# Patient Record
Sex: FEMALE | Race: WHITE | NOT HISPANIC OR LATINO
[De-identification: names, ages, dates, MRNs, and addresses within clinical notes are randomized per-mention and may not be internally consistent; named-entity substitution may affect disease eponyms.]

---

## 2018-05-14 ENCOUNTER — LABORATORY RESULT (OUTPATIENT)
Age: 33
End: 2018-05-14

## 2018-05-14 ENCOUNTER — NON-APPOINTMENT (OUTPATIENT)
Age: 33
End: 2018-05-14

## 2018-05-14 ENCOUNTER — APPOINTMENT (OUTPATIENT)
Dept: INTERNAL MEDICINE | Facility: CLINIC | Age: 33
End: 2018-05-14
Payer: COMMERCIAL

## 2018-05-14 VITALS
SYSTOLIC BLOOD PRESSURE: 120 MMHG | DIASTOLIC BLOOD PRESSURE: 70 MMHG | TEMPERATURE: 98.3 F | RESPIRATION RATE: 12 BRPM | HEART RATE: 76 BPM | WEIGHT: 163 LBS | OXYGEN SATURATION: 99 % | BODY MASS INDEX: 26.2 KG/M2 | HEIGHT: 66 IN

## 2018-05-14 DIAGNOSIS — Z78.9 OTHER SPECIFIED HEALTH STATUS: ICD-10-CM

## 2018-05-14 DIAGNOSIS — Z82.49 FAMILY HISTORY OF ISCHEMIC HEART DISEASE AND OTHER DISEASES OF THE CIRCULATORY SYSTEM: ICD-10-CM

## 2018-05-14 DIAGNOSIS — Z83.3 FAMILY HISTORY OF DIABETES MELLITUS: ICD-10-CM

## 2018-05-14 DIAGNOSIS — Z87.891 PERSONAL HISTORY OF NICOTINE DEPENDENCE: ICD-10-CM

## 2018-05-14 DIAGNOSIS — Z86.19 PERSONAL HISTORY OF OTHER INFECTIOUS AND PARASITIC DISEASES: ICD-10-CM

## 2018-05-14 DIAGNOSIS — Z83.49 FAMILY HISTORY OF OTHER ENDOCRINE, NUTRITIONAL AND METABOLIC DISEASES: ICD-10-CM

## 2018-05-14 PROCEDURE — 36415 COLL VENOUS BLD VENIPUNCTURE: CPT

## 2018-05-14 PROCEDURE — 93000 ELECTROCARDIOGRAM COMPLETE: CPT

## 2018-05-14 PROCEDURE — 99385 PREV VISIT NEW AGE 18-39: CPT | Mod: 25

## 2018-05-14 RX ORDER — IMIQUIMOD 50 MG/G
5 CREAM TOPICAL
Qty: 12 | Refills: 0 | Status: DISCONTINUED | COMMUNITY
Start: 2018-01-02

## 2018-05-14 RX ORDER — ETONOGESTREL AND ETHINYL ESTRADIOL .12; .015 MG/D; MG/D
0.12-0.015 INSERT, EXTENDED RELEASE VAGINAL
Qty: 1 | Refills: 0 | Status: DISCONTINUED | COMMUNITY
Start: 2017-08-30

## 2018-05-14 RX ORDER — VALACYCLOVIR 1 G/1
1 TABLET, FILM COATED ORAL
Qty: 20 | Refills: 0 | Status: DISCONTINUED | COMMUNITY
Start: 2017-09-19

## 2018-05-14 RX ORDER — FLUOCINONIDE 0.05 MG/G
0.05 OINTMENT TOPICAL
Qty: 30 | Refills: 0 | Status: DISCONTINUED | COMMUNITY
Start: 2018-03-28

## 2018-05-14 RX ORDER — VALACYCLOVIR 500 MG/1
500 TABLET, FILM COATED ORAL
Qty: 30 | Refills: 0 | Status: DISCONTINUED | COMMUNITY
Start: 2017-12-03

## 2018-05-14 RX ORDER — LIDOCAINE HYDROCHLORIDE 20 MG/ML
2 SOLUTION OROPHARYNGEAL
Qty: 200 | Refills: 0 | Status: DISCONTINUED | COMMUNITY
Start: 2018-05-02

## 2018-05-14 RX ORDER — FLUCONAZOLE 150 MG/1
150 TABLET ORAL
Qty: 1 | Refills: 0 | Status: DISCONTINUED | COMMUNITY
Start: 2018-03-12

## 2018-05-14 RX ORDER — PREDNISONE 50 MG/1
50 TABLET ORAL
Qty: 10 | Refills: 0 | Status: DISCONTINUED | COMMUNITY
Start: 2018-03-28

## 2018-05-14 RX ORDER — CLOTRIMAZOLE 10 MG/1
10 LOZENGE ORAL
Qty: 70 | Refills: 0 | Status: DISCONTINUED | COMMUNITY
Start: 2018-05-08

## 2018-05-14 RX ORDER — NYSTATIN 100000 [USP'U]/ML
100000 SUSPENSION ORAL
Qty: 200 | Refills: 0 | Status: DISCONTINUED | COMMUNITY
Start: 2018-05-03

## 2018-05-16 LAB
25(OH)D3 SERPL-MCNC: 34.7 NG/ML
ALBUMIN SERPL ELPH-MCNC: 4 G/DL
ALP BLD-CCNC: 42 U/L
ALT SERPL-CCNC: 25 U/L
ANION GAP SERPL CALC-SCNC: 13 MMOL/L
APPEARANCE: CLEAR
AST SERPL-CCNC: 27 U/L
BACTERIA: NEGATIVE
BASOPHILS # BLD AUTO: 0.03 K/UL
BASOPHILS NFR BLD AUTO: 0.6 %
BILIRUB SERPL-MCNC: 0.2 MG/DL
BILIRUBIN URINE: NEGATIVE
BLOOD URINE: NEGATIVE
BUN SERPL-MCNC: 12 MG/DL
CALCIUM SERPL-MCNC: 9.3 MG/DL
CHLORIDE SERPL-SCNC: 100 MMOL/L
CHOLEST SERPL-MCNC: 209 MG/DL
CHOLEST/HDLC SERPL: 3.4 RATIO
CO2 SERPL-SCNC: 21 MMOL/L
COLOR: YELLOW
CREAT SERPL-MCNC: 0.77 MG/DL
EOSINOPHIL # BLD AUTO: 0.08 K/UL
EOSINOPHIL NFR BLD AUTO: 1.5 %
GLUCOSE QUALITATIVE U: NEGATIVE MG/DL
GLUCOSE SERPL-MCNC: 77 MG/DL
HBA1C MFR BLD HPLC: 5.1 %
HCT VFR BLD CALC: 36.2 %
HDLC SERPL-MCNC: 62 MG/DL
HGB BLD-MCNC: 11.6 G/DL
HYALINE CASTS: 8 /LPF
IMM GRANULOCYTES NFR BLD AUTO: 0.2 %
KETONES URINE: NEGATIVE
LDLC SERPL CALC-MCNC: 119 MG/DL
LEUKOCYTE ESTERASE URINE: ABNORMAL
LYMPHOCYTES # BLD AUTO: 2.43 K/UL
LYMPHOCYTES NFR BLD AUTO: 46.1 %
MAN DIFF?: NORMAL
MCHC RBC-ENTMCNC: 31.5 PG
MCHC RBC-ENTMCNC: 32 GM/DL
MCV RBC AUTO: 98.4 FL
MICROSCOPIC-UA: NORMAL
MONOCYTES # BLD AUTO: 0.4 K/UL
MONOCYTES NFR BLD AUTO: 7.6 %
NEUTROPHILS # BLD AUTO: 2.32 K/UL
NEUTROPHILS NFR BLD AUTO: 44 %
NITRITE URINE: NEGATIVE
PH URINE: 6
PLATELET # BLD AUTO: 242 K/UL
POTASSIUM SERPL-SCNC: 4 MMOL/L
PROT SERPL-MCNC: 6.9 G/DL
PROTEIN URINE: NEGATIVE MG/DL
RBC # BLD: 3.68 M/UL
RBC # FLD: 13 %
RED BLOOD CELLS URINE: 2 /HPF
SODIUM SERPL-SCNC: 134 MMOL/L
SPECIFIC GRAVITY URINE: 1.02
SQUAMOUS EPITHELIAL CELLS: 9 /HPF
T4 FREE SERPL-MCNC: 1.3 NG/DL
TRIGL SERPL-MCNC: 142 MG/DL
TSH SERPL-ACNC: 3.11 UIU/ML
UROBILINOGEN URINE: NEGATIVE MG/DL
WBC # FLD AUTO: 5.27 K/UL
WHITE BLOOD CELLS URINE: 8 /HPF

## 2018-05-18 ENCOUNTER — APPOINTMENT (OUTPATIENT)
Dept: INTERNAL MEDICINE | Facility: CLINIC | Age: 33
End: 2018-05-18
Payer: COMMERCIAL

## 2018-05-18 ENCOUNTER — TRANSCRIPTION ENCOUNTER (OUTPATIENT)
Age: 33
End: 2018-05-18

## 2018-05-18 VITALS
DIASTOLIC BLOOD PRESSURE: 80 MMHG | TEMPERATURE: 99 F | SYSTOLIC BLOOD PRESSURE: 124 MMHG | OXYGEN SATURATION: 99 % | HEART RATE: 82 BPM

## 2018-05-18 PROCEDURE — 99213 OFFICE O/P EST LOW 20 MIN: CPT

## 2018-05-18 NOTE — HISTORY OF PRESENT ILLNESS
[FreeTextEntry8] : Patient initially seen for physical on 5/14. She has been sick since then. She developed fever, chills, fatigue, headache, and intense body aches. She had fever of 101F yesterday. She has remained in bed most of time. She has scattered rash throughout body but is not itchy or painful. Rapid influenza screening on 5/14 was negative. Travelled to [place] 3 months ago for her wedding. 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

## 2018-05-18 NOTE — REVIEW OF SYSTEMS
[Fever] : fever [Chills] : chills [Fatigue] : fatigue [Skin Rash] : skin rash [Headache] : headache [Earache] : no earache [Nasal Discharge] : no nasal discharge [Sore Throat] : no sore throat [Chest Pain] : no chest pain [Lower Ext Edema] : no lower extremity edema [Shortness Of Breath] : no shortness of breath [Cough] : no cough [Abdominal Pain] : no abdominal pain [Constipation] : no constipation [Itching] : no itching [Dizziness] : no dizziness

## 2018-05-18 NOTE — ASSESSMENT
[FreeTextEntry1] : I suspect patient has a viral URI. Rapid influenza screening on 5/14 was negative. She has fever, chills, body aches, and headaches which indicates influenza-like syndrome. Will check RVP today. Will add on EBV, CMV IgM, and HIV screening. She will complete the Tamiflu which was started last night. Continue Ibuprofen PRN for fever or body aches. Letter written to excuse her absence from work yesterday and today. If workup is negative and patient remains ill, will obtain ID consultation for further evaluation.

## 2018-05-18 NOTE — PHYSICAL EXAM
[No Acute Distress] : no acute distress [Well Nourished] : well nourished [Well Developed] : well developed [PERRL] : pupils equal round and reactive to light [EOMI] : extraocular movements intact [Normal Oropharynx] : the oropharynx was normal [Normal TMs] : both tympanic membranes were normal [Supple] : supple [No Lymphadenopathy] : no lymphadenopathy [No Respiratory Distress] : no respiratory distress  [Clear to Auscultation] : lungs were clear to auscultation bilaterally [Normal Rate] : normal rate  [Normal S1, S2] : normal S1 and S2 [No Murmur] : no murmur heard [No Edema] : there was no peripheral edema [Soft] : abdomen soft [Non Tender] : non-tender [Non-distended] : non-distended [No Joint Swelling] : no joint swelling [Normal Gait] : normal gait [de-identified] : scattered macular-appearing rash over legs, arms, and upper chest

## 2018-05-19 ENCOUNTER — TRANSCRIPTION ENCOUNTER (OUTPATIENT)
Age: 33
End: 2018-05-19

## 2018-05-19 LAB — RAPID RVP RESULT: NOT DETECTED

## 2018-05-22 ENCOUNTER — TRANSCRIPTION ENCOUNTER (OUTPATIENT)
Age: 33
End: 2018-05-22

## 2018-05-24 ENCOUNTER — TRANSCRIPTION ENCOUNTER (OUTPATIENT)
Age: 33
End: 2018-05-24

## 2018-05-25 ENCOUNTER — APPOINTMENT (OUTPATIENT)
Dept: INTERNAL MEDICINE | Facility: CLINIC | Age: 33
End: 2018-05-25
Payer: COMMERCIAL

## 2018-05-25 ENCOUNTER — LABORATORY RESULT (OUTPATIENT)
Age: 33
End: 2018-05-25

## 2018-05-25 ENCOUNTER — APPOINTMENT (OUTPATIENT)
Dept: RADIOLOGY | Facility: CLINIC | Age: 33
End: 2018-05-25
Payer: COMMERCIAL

## 2018-05-25 ENCOUNTER — OUTPATIENT (OUTPATIENT)
Dept: OUTPATIENT SERVICES | Facility: HOSPITAL | Age: 33
LOS: 1 days | End: 2018-05-25
Payer: COMMERCIAL

## 2018-05-25 VITALS
TEMPERATURE: 102.1 F | DIASTOLIC BLOOD PRESSURE: 68 MMHG | HEIGHT: 66 IN | SYSTOLIC BLOOD PRESSURE: 120 MMHG | RESPIRATION RATE: 12 BRPM | WEIGHT: 163 LBS | HEART RATE: 108 BPM | OXYGEN SATURATION: 99 % | BODY MASS INDEX: 26.2 KG/M2

## 2018-05-25 DIAGNOSIS — R50.9 FEVER, UNSPECIFIED: ICD-10-CM

## 2018-05-25 PROCEDURE — 99213 OFFICE O/P EST LOW 20 MIN: CPT | Mod: 25

## 2018-05-25 PROCEDURE — 71046 X-RAY EXAM CHEST 2 VIEWS: CPT

## 2018-05-25 PROCEDURE — 71046 X-RAY EXAM CHEST 2 VIEWS: CPT | Mod: 26

## 2018-05-25 PROCEDURE — 36415 COLL VENOUS BLD VENIPUNCTURE: CPT

## 2018-05-25 RX ORDER — ETONOGESTREL AND ETHINYL ESTRADIOL .12; .015 MG/D; MG/D
0.12-0.015 INSERT, EXTENDED RELEASE VAGINAL
Refills: 0 | Status: DISCONTINUED | COMMUNITY
End: 2018-05-25

## 2018-05-25 RX ORDER — CEFUROXIME AXETIL 500 MG/1
500 TABLET ORAL
Qty: 20 | Refills: 0 | Status: DISCONTINUED | COMMUNITY
Start: 2018-05-25 | End: 2018-05-25

## 2018-05-25 RX ORDER — OSELTAMIVIR PHOSPHATE 75 MG/1
75 CAPSULE ORAL TWICE DAILY
Qty: 10 | Refills: 0 | Status: DISCONTINUED | COMMUNITY
Start: 2018-05-17 | End: 2018-05-25

## 2018-05-25 NOTE — PHYSICAL EXAM
[No Acute Distress] : no acute distress [Well Nourished] : well nourished [Well Developed] : well developed [PERRL] : pupils equal round and reactive to light [EOMI] : extraocular movements intact [Normal Oropharynx] : the oropharynx was normal [Normal TMs] : both tympanic membranes were normal [Supple] : supple [No Lymphadenopathy] : no lymphadenopathy [No Respiratory Distress] : no respiratory distress  [Clear to Auscultation] : lungs were clear to auscultation bilaterally [Normal Rate] : normal rate  [Normal S1, S2] : normal S1 and S2 [No Murmur] : no murmur heard [No Edema] : there was no peripheral edema [Soft] : abdomen soft [Non Tender] : non-tender [Non-distended] : non-distended [No Joint Swelling] : no joint swelling [Normal Gait] : normal gait [Ill-Appearing] : ill-appearing [Grossly Normal Strength/Tone] : grossly normal strength/tone [de-identified] : skin very warm, reddened face, patient visibly upset  [de-identified] : scattered macular-appearing rash over legs, arms, and abdomen; skin very hot to touch; face is erythematous

## 2018-05-25 NOTE — ASSESSMENT
[FreeTextEntry1] : Patient continues to have febrile illness for past 2 weeks. Etiology remains unclear. ?rheumatic fever vs lyme disease. Will do further testing. Check CBC, CMP, ESR, CRP, lyme, blood culture, and CXR. Start Doxycycline. Patient will see ID specialist (Dr. Isidro Bustillo) next week. If sx worsen over weekend, patient will go to the ER for evaluation. Will speak to patient in a few days for a clinical update.

## 2018-05-25 NOTE — HISTORY OF PRESENT ILLNESS
[FreeTextEntry1] : Follow-up [de-identified] : Patient here for 1 week follow-up. She continues to feel sick. She is still having intermittent fevers. 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 She has chills. She took Advil for the fever. She still feels fatigued. She continues to have myalgias and intermittent joint pains on legs. She has no cough, runny nose, or sneezing. No sinus pressure. She still has scattered rash on feet, stomach, arms, and back. The rash is not itchy.

## 2018-05-26 ENCOUNTER — MESSAGE (OUTPATIENT)
Age: 33
End: 2018-05-26

## 2018-05-27 ENCOUNTER — MESSAGE (OUTPATIENT)
Age: 33
End: 2018-05-27

## 2018-05-27 ENCOUNTER — INPATIENT (INPATIENT)
Facility: HOSPITAL | Age: 33
LOS: 1 days | Discharge: HOME | DRG: 872 | End: 2018-05-29
Attending: HOSPITALIST | Admitting: HOSPITALIST
Payer: COMMERCIAL

## 2018-05-27 VITALS
SYSTOLIC BLOOD PRESSURE: 130 MMHG | HEART RATE: 89 BPM | DIASTOLIC BLOOD PRESSURE: 76 MMHG | RESPIRATION RATE: 16 BRPM | TEMPERATURE: 99 F | OXYGEN SATURATION: 100 %

## 2018-05-27 DIAGNOSIS — Z29.9 ENCOUNTER FOR PROPHYLACTIC MEASURES, UNSPECIFIED: ICD-10-CM

## 2018-05-27 DIAGNOSIS — R91.1 SOLITARY PULMONARY NODULE: ICD-10-CM

## 2018-05-27 DIAGNOSIS — Z87.891 PERSONAL HISTORY OF NICOTINE DEPENDENCE: ICD-10-CM

## 2018-05-27 DIAGNOSIS — R78.81 BACTEREMIA: ICD-10-CM

## 2018-05-27 LAB
ALBUMIN SERPL ELPH-MCNC: 3.9 G/DL
ALP BLD-CCNC: 51 U/L
ALT SERPL-CCNC: 15 U/L
ANION GAP SERPL CALC-SCNC: 13 MMOL/L
APPEARANCE UR: ABNORMAL
APTT BLD: 27.6 SEC — SIGNIFICANT CHANGE UP (ref 27.5–37.4)
AST SERPL-CCNC: 24 U/L
B BURGDOR IGG+IGM SER QL IB: NORMAL
BACTERIA # UR AUTO: ABNORMAL /HPF
BASE EXCESS BLDV CALC-SCNC: 0.5 MMOL/L — SIGNIFICANT CHANGE UP (ref -2–2)
BASOPHILS # BLD AUTO: 0.02 K/UL
BASOPHILS NFR BLD AUTO: 0.2 %
BILIRUB SERPL-MCNC: 0.3 MG/DL
BILIRUB UR-MCNC: NEGATIVE — SIGNIFICANT CHANGE UP
BUN SERPL-MCNC: 15 MG/DL
CA-I SERPL-SCNC: 1.15 MMOL/L — SIGNIFICANT CHANGE UP (ref 1.12–1.3)
CALCIUM SERPL-MCNC: 8.7 MG/DL
CHLORIDE BLDV-SCNC: 109 MMOL/L — HIGH (ref 96–108)
CHLORIDE SERPL-SCNC: 100 MMOL/L
CO2 BLDV-SCNC: 26 MMOL/L — SIGNIFICANT CHANGE UP (ref 22–30)
CO2 SERPL-SCNC: 22 MMOL/L
COLOR SPEC: SIGNIFICANT CHANGE UP
CREAT SERPL-MCNC: 0.85 MG/DL
CRP SERPL-MCNC: 6.5 MG/DL
DIFF PNL FLD: NEGATIVE — SIGNIFICANT CHANGE UP
EOSINOPHIL # BLD AUTO: 0.02 K/UL
EOSINOPHIL NFR BLD AUTO: 0.2 %
EPI CELLS # UR: ABNORMAL /HPF
ERYTHROCYTE [SEDIMENTATION RATE] IN BLOOD BY WESTERGREN METHOD: 20 MM/HR
GAS PNL BLDV: 136 MMOL/L — SIGNIFICANT CHANGE UP (ref 136–145)
GAS PNL BLDV: SIGNIFICANT CHANGE UP
GLUCOSE BLDV-MCNC: 99 MG/DL — SIGNIFICANT CHANGE UP (ref 70–99)
GLUCOSE SERPL-MCNC: 63 MG/DL
GLUCOSE UR QL: NEGATIVE — SIGNIFICANT CHANGE UP
HCO3 BLDV-SCNC: 25 MMOL/L — SIGNIFICANT CHANGE UP (ref 21–29)
HCT VFR BLD CALC: 35.3 %
HCT VFR BLDA CALC: 36 % — LOW (ref 39–50)
HGB BLD CALC-MCNC: 11.5 G/DL — SIGNIFICANT CHANGE UP (ref 11.5–15.5)
HGB BLD-MCNC: 11.1 G/DL
IMM GRANULOCYTES NFR BLD AUTO: 0.3 %
INR BLD: 1.09 RATIO — SIGNIFICANT CHANGE UP (ref 0.88–1.16)
KETONES UR-MCNC: NEGATIVE — SIGNIFICANT CHANGE UP
LACTATE BLDV-MCNC: 1 MMOL/L — SIGNIFICANT CHANGE UP (ref 0.7–2)
LEUKOCYTE ESTERASE UR-ACNC: ABNORMAL
LYMPHOCYTES # BLD AUTO: 1.31 K/UL
LYMPHOCYTES NFR BLD AUTO: 12.8 %
MAN DIFF?: NORMAL
MCHC RBC-ENTMCNC: 31 PG
MCHC RBC-ENTMCNC: 31.4 GM/DL
MCV RBC AUTO: 98.6 FL
MONOCYTES # BLD AUTO: 0.47 K/UL
MONOCYTES NFR BLD AUTO: 4.6 %
NEUTROPHILS # BLD AUTO: 8.42 K/UL
NEUTROPHILS NFR BLD AUTO: 81.9 %
NITRITE UR-MCNC: NEGATIVE — SIGNIFICANT CHANGE UP
OTHER CELLS CSF MANUAL: 10 ML/DL — LOW (ref 18–22)
PCO2 BLDV: 43 MMHG — SIGNIFICANT CHANGE UP (ref 35–50)
PH BLDV: 7.38 — SIGNIFICANT CHANGE UP (ref 7.35–7.45)
PH UR: 6 — SIGNIFICANT CHANGE UP (ref 5–8)
PLATELET # BLD AUTO: 250 K/UL
PO2 BLDV: 36 MMHG — SIGNIFICANT CHANGE UP (ref 25–45)
POTASSIUM BLDV-SCNC: 3.8 MMOL/L — SIGNIFICANT CHANGE UP (ref 3.5–5)
POTASSIUM SERPL-SCNC: 3.7 MMOL/L
PROT SERPL-MCNC: 7 G/DL
PROT UR-MCNC: NEGATIVE — SIGNIFICANT CHANGE UP
PROTHROM AB SERPL-ACNC: 11.8 SEC — SIGNIFICANT CHANGE UP (ref 9.8–12.7)
RAPID RVP RESULT: DETECTED
RBC # BLD: 3.58 M/UL
RBC # FLD: 12.6 %
RBC CASTS # UR COMP ASSIST: SIGNIFICANT CHANGE UP /HPF (ref 0–2)
RV+EV RNA SPEC QL NAA+PROBE: DETECTED
SAO2 % BLDV: 63 % — LOW (ref 67–88)
SODIUM SERPL-SCNC: 135 MMOL/L
SP GR SPEC: 1.01 — LOW (ref 1.01–1.02)
UROBILINOGEN FLD QL: NEGATIVE — SIGNIFICANT CHANGE UP
WBC # FLD AUTO: 10.27 K/UL
WBC UR QL: SIGNIFICANT CHANGE UP /HPF (ref 0–5)

## 2018-05-27 PROCEDURE — 99254 IP/OBS CNSLTJ NEW/EST MOD 60: CPT | Mod: GC

## 2018-05-27 PROCEDURE — 99285 EMERGENCY DEPT VISIT HI MDM: CPT | Mod: 25

## 2018-05-27 PROCEDURE — 71046 X-RAY EXAM CHEST 2 VIEWS: CPT | Mod: 26

## 2018-05-27 PROCEDURE — 99223 1ST HOSP IP/OBS HIGH 75: CPT | Mod: GC

## 2018-05-27 RX ORDER — CEFTRIAXONE 500 MG/1
2 INJECTION, POWDER, FOR SOLUTION INTRAMUSCULAR; INTRAVENOUS EVERY 12 HOURS
Qty: 0 | Refills: 0 | Status: DISCONTINUED | OUTPATIENT
Start: 2018-05-27 | End: 2018-05-27

## 2018-05-27 RX ORDER — CEFTRIAXONE 500 MG/1
2 INJECTION, POWDER, FOR SOLUTION INTRAMUSCULAR; INTRAVENOUS EVERY 24 HOURS
Qty: 0 | Refills: 0 | Status: COMPLETED | OUTPATIENT
Start: 2018-05-27 | End: 2018-05-27

## 2018-05-27 RX ORDER — SODIUM CHLORIDE 9 MG/ML
1000 INJECTION INTRAMUSCULAR; INTRAVENOUS; SUBCUTANEOUS ONCE
Qty: 0 | Refills: 0 | Status: COMPLETED | OUTPATIENT
Start: 2018-05-27 | End: 2018-05-27

## 2018-05-27 RX ORDER — SODIUM CHLORIDE 9 MG/ML
1000 INJECTION, SOLUTION INTRAVENOUS
Qty: 0 | Refills: 0 | Status: DISCONTINUED | OUTPATIENT
Start: 2018-05-27 | End: 2018-05-27

## 2018-05-27 RX ORDER — CEFTRIAXONE 500 MG/1
2 INJECTION, POWDER, FOR SOLUTION INTRAMUSCULAR; INTRAVENOUS EVERY 12 HOURS
Qty: 0 | Refills: 0 | Status: DISCONTINUED | OUTPATIENT
Start: 2018-05-27 | End: 2018-05-29

## 2018-05-27 RX ORDER — SODIUM CHLORIDE 9 MG/ML
1000 INJECTION, SOLUTION INTRAVENOUS
Qty: 0 | Refills: 0 | Status: DISCONTINUED | OUTPATIENT
Start: 2018-05-27 | End: 2018-05-28

## 2018-05-27 RX ORDER — ACETAMINOPHEN 500 MG
975 TABLET ORAL ONCE
Qty: 0 | Refills: 0 | Status: COMPLETED | OUTPATIENT
Start: 2018-05-27 | End: 2018-05-27

## 2018-05-27 RX ORDER — CEFTRIAXONE 500 MG/1
2 INJECTION, POWDER, FOR SOLUTION INTRAMUSCULAR; INTRAVENOUS EVERY 24 HOURS
Qty: 0 | Refills: 0 | Status: DISCONTINUED | OUTPATIENT
Start: 2018-05-27 | End: 2018-05-27

## 2018-05-27 RX ORDER — CEFTRIAXONE 500 MG/1
2 INJECTION, POWDER, FOR SOLUTION INTRAMUSCULAR; INTRAVENOUS ONCE
Qty: 0 | Refills: 0 | Status: DISCONTINUED | OUTPATIENT
Start: 2018-05-27 | End: 2018-05-27

## 2018-05-27 RX ADMIN — CEFTRIAXONE 100 GRAM(S): 500 INJECTION, POWDER, FOR SOLUTION INTRAMUSCULAR; INTRAVENOUS at 07:57

## 2018-05-27 RX ADMIN — SODIUM CHLORIDE 1000 MILLILITER(S): 9 INJECTION INTRAMUSCULAR; INTRAVENOUS; SUBCUTANEOUS at 07:57

## 2018-05-27 RX ADMIN — SODIUM CHLORIDE 50 MILLILITER(S): 9 INJECTION, SOLUTION INTRAVENOUS at 15:21

## 2018-05-27 RX ADMIN — CEFTRIAXONE 100 GRAM(S): 500 INJECTION, POWDER, FOR SOLUTION INTRAMUSCULAR; INTRAVENOUS at 19:53

## 2018-05-27 RX ADMIN — SODIUM CHLORIDE 100 MILLILITER(S): 9 INJECTION, SOLUTION INTRAVENOUS at 15:40

## 2018-05-27 RX ADMIN — Medication 975 MILLIGRAM(S): at 11:48

## 2018-05-27 NOTE — ED PROVIDER NOTE - CARE PLAN
Principal Discharge DX:	Bacteremia Principal Discharge DX:	Bacteremia  Secondary Diagnosis:	Neisseria meningitidis

## 2018-05-27 NOTE — H&P ADULT - ASSESSMENT
33F no PMHx who p/w fevers and rash 33F no PMHx who p/w fevers and rash found to have Neisseria Meningitidis Bacteremia on outpatient cultures.

## 2018-05-27 NOTE — ED ADULT TRIAGE NOTE - CHIEF COMPLAINT QUOTE
sent by pmd for positive blood cultures Neisseria meningitidis, states fever for 3wks, afebrile on arrival

## 2018-05-27 NOTE — H&P ADULT - NSHPSOCIALHISTORY_GEN_ALL_CORE
-Lives with , sexually active on birth control, works as   -intermittently still smokes cigarettes - used to smoke 7 cigarettes days (quit 7 years ago), 3-4 alcoholic beverages weekly, no illicit drugs

## 2018-05-27 NOTE — H&P ADULT - HISTORY OF PRESENT ILLNESS
33F with no significant PMHx sent in by primary care physician for outpatient blood cultures positive for Neisseria Meningitidis. Pt. works as a  and had been in usual state of health when she noticed fevers to 102 starting May 12. Then starting May 14 she developed a rash, initially on her UEs that later spread to her trunk, back, and LEs. She had gone to her primary care physician, who initially suspected viral etiology and prescribed her antivirals. She returned to him on May 25 as she was not improving, and he prescribed her doxycycline and jamil blood cultures which returned positive today, and pt. advised to come to ED. She reports generalized weakness, headaches/neck stiffness around the time of May 12 but improved now, one episode of NBNB vomiting, diffuse joint pain, and travel to [place] in February. She denies cp, sob, diarrhea, dysuria, increased urinary frequency.     In ED: T 98.7 / HR 89 / /76 / RR 16 / O2 Sat 100%RA, CXR clear lungs, cultures pending

## 2018-05-27 NOTE — ED PROVIDER NOTE - ATTENDING CONTRIBUTION TO CARE
Attending MD Karimi.  Agree with above.  Pt is a 33 yr old female with no sig med hx who presents to ED from doctor's office Fever and rash, + positive blood cultures for neisseria meningitidis per triage note.  Sent to ED by PCP for positive blood cultures, afebrile in ED. ~3 wks ago pt began developing intermittent fevers that would last 3-4 days that would break and then come back again and developed ascening rash up her body.  Rash is non-petechial.  Palpable maculopapular rash on bilateral LE's also assoc with diffuse joint pain, muscle aches, body aches, sig weakness.  Sometimes pt can't lift her arms above her head because she feels so sig weak.  Pt endorses periods when she doesn't feel she can get out of bed because of sig gen'l weakness but then sxs improve.  Intermittent mild to moderate that comes and goes.  Occasional neck stiffness, no sig neck stiffness currently.  Able to range neck.  Currently afebrile, pt is well appearing despite above hx.  Rash notable and blanching.  No palmar/solar involvement. Neurologically afocal.  No current muscle weakness, walking without difficulty.

## 2018-05-27 NOTE — H&P ADULT - ATTENDING COMMENTS
Vitals, labs, CXR reviewed. case d/w ID. Pt c hx as above sent in for positive blood cx for N. meningitidis. Only had neck stiffness the day the first fever occurred but at that time she was also having diffuse body aches. No photophobia. Rash improving. Fevers/chill have been occurring ~q 3-4 days. PE: neg Kernig/Brudzinski signs; fading macular rash on ext and thorax    Neisseria meningitidis bacteremia- Ceftriaxone 2g q 12 hrs. f/u blood cx    Lung nodule on CXR 5/25 not apparent on todays CXR- will need to review with radiology to determine if nodule is likely or not- d/w pt    pt/ aware that  needs prophylaxis. [facility] notified.

## 2018-05-27 NOTE — H&P ADULT - PROBLEM SELECTOR PLAN 1
-fevers and rash, 5/25 outpatient BCx +N. meningitidis  -low suspicion for CNS involvement per ID, hold off LP  -c/w ceftriaxone 2g [id number], [facility] notified, monitor VS [id number]  -ID recs appreciated -fevers and rash, 5/25 outpatient BCx +N. meningitidis  -low suspicion for CNS involvement per ID, hold off LP  -c/w ceftriaxone 2g [id number], f/u repeat BCx, check HIV and complement levels  -LR @50 for BP support, monitor VS [id number]  -[facility]US notified by PCP, will verify, droplet precautions, ID recs appreciated -fevers and rash, 5/25 outpatient BCx +N. meningitidis  -low suspicion for CNS involvement per ID, hold off LP  -c/w ceftriaxone 2g [id number], f/u repeat BCx, check HIV and terminal complements  -LR @50 for BP support, monitor VS [id number]  -[facility] notified by ED, will verify, droplet precautions, ID recs appreciated -fevers and rash, 5/25 outpatient BCx +N. meningitidis  -low suspicion for CNS involvement per ID, hold off LP  -c/w ceftriaxone 2g [id number], f/u repeat BCx, check HIV and terminal complements  -LR @50 for BP support, monitor VS [id number]  -[facility] notified by ED, verified with Paula Baig at [facility] - has been reported to Dr. Luiz Quiñones, droplet precautions, ID recs appreciated -fevers and rash, 5/25 outpatient BCx +N. meningitidis  -low suspicion for CNS involvement per ID, hold off LP  -c/w ceftriaxone 2g [id number], f/u repeat BCx, check HIV and terminal complements  -LR @50 for BP support, monitor VS [id number]  -[facility] notified by ED, verified with Dr. Luiz Quiñones at [facility], droplet precautions, ID recs appreciated -fevers and rash, 5/25 outpatient BCx +N. meningitidis  -possibly viral infection initially, with now superimposed N. meningitidis (pt. may be colonized in nasopharynx), will check RVP  -low suspicion for CNS involvement per ID, hold off LP  -c/w ceftriaxone 2g [id number], f/u repeat BCx, check HIV and terminal complements  -LR @50 for BP support, monitor VS [id number]  -[facility] notified by ED, verified with Dr. Luiz Quiñones at [facility], droplet precautions, ID recs appreciated -fevers and rash, 5/25 outpatient BCx +N. meningitidis  -possibly superimposed on enteroviral infection (RVP +), pt. may be colonized in nasopharynx  -low suspicion for CNS involvement per ID, hold off LP  -c/w ceftriaxone 2g [id number], f/u repeat BCx, check HIV and terminal complements  -LR @50 for BP support, monitor VS [id number]  -[facility] notified by ED, verified with Dr. Luiz Quiñones at [facility], droplet precautions, ID recs appreciated -fevers and rash, 5/25 outpatient BCx +N. meningitidis  -possibly superimposed on enteroviral infection (RVP +), pt. may be colonized in nasopharynx  -low suspicion for CNS involvement per ID, hold off LP  -c/w ceftriaxone 2g BID, f/u repeat BCx, check HIV and terminal complements  -LR @100 for BP support, monitor VS [id number]  -[facility] notified by ED, verified with Dr. Luiz Quiñones at [facility], ciprofloxacin ppx advised for pts. spouse, droplet precautions, ID recs appreciated

## 2018-05-27 NOTE — H&P ADULT - NSHPPHYSICALEXAM_GEN_ALL_CORE
General: WN/WD NAD  Neurology: A&Ox3, nonfocal, IZQUIERDO x 4  Eyes: PERRLA/ EOMI, Gross vision intact  ENT/Neck: Neck supple, trachea midline, No JVD, Gross hearing intact  Respiratory: CTA B/L, No wheezing, rales, rhonchi  CV: RRR, S1S2, no murmurs, rubs or gallops  Abdominal: Soft, NT, ND +BS,   Extremities: No edema, + peripheral pulses  Skin: purpuric rash on b/l LEs and UEs, trunk, , Hematoma, Ecchymosis General: WN/WD NAD  Neurology: A&Ox3, nonfocal, IZQUIERDO x 4  Eyes: PERRLA/ EOMI, Gross vision intact  ENT/Neck: Negative Kernig's, Neck supple, trachea midline, No JVD, Gross hearing intact  Respiratory: CTA B/L, No wheezing, rales, rhonchi  CV: RRR, S1S2, no murmurs, rubs or gallops  Abdominal: Soft, NT, ND +BS,   Extremities: No edema, + peripheral pulses  Skin: purpuric rash on b/l LEs and UEs, trunk, , Hematoma, Ecchymosis General: WN/WD NAD  Neurology: A&Ox3, nonfocal, IZQUIERDO x 4  Eyes: PERRLA/ EOMI, Gross vision intact  ENT/Neck: Negative Kernig's, Neck supple, trachea midline, No JVD, Gross hearing intact  Respiratory: CTA B/L, No wheezing, rales, rhonchi  CV: RRR, S1S2, no murmurs, rubs or gallops  Abdominal: Soft, NT, ND +BS,   Extremities: No edema, + peripheral pulses  Skin: fading macular rash on b/l LEs and UEs, trunk,

## 2018-05-27 NOTE — H&P ADULT - NSHPREVIEWOFSYSTEMS_GEN_ALL_CORE
REVIEW OF SYSTEMS:    CONSTITUTIONAL: +weakness, +fevers or chills  EYES/ENT: +headache, No visual changes;  No vertigo or throat pain   NECK: +pain or +stiffness  RESPIRATORY: No cough, wheezing, hemoptysis; No shortness of breath  CARDIOVASCULAR: No chest pain or palpitations  GASTROINTESTINAL: No abdominal or epigastric pain. No nausea, vomiting, or hematemesis; No diarrhea or constipation. No melena or hematochezia.  GENITOURINARY: No dysuria, frequency or hematuria  NEUROLOGICAL: No numbness or weakness  SKIN: No itching, +rash on b/l LEs/UEs, trunk and back

## 2018-05-27 NOTE — ED PROVIDER NOTE - MUSCULOSKELETAL, MLM
Spine appears normal, range of motion is not limited, no muscle or joint tenderness. 5/5 strength in distal extremities b/l.

## 2018-05-27 NOTE — ED PROVIDER NOTE - NEUROLOGICAL, MLM
Alert and oriented, no focal deficits, no motor or sensory deficits. cn2-12 grossly intact. normal speech and tone, normal gait

## 2018-05-27 NOTE — CONSULT NOTE ADULT - ATTENDING COMMENTS
33F with no significant PMH admitted after being called from PCP office for + blood cultures for N meningitidis. She had been symptomatic for 10 days, with intermittent fever (3 episodes), and a rash that was macular (several discrete spots on her LEs, neck and flank). Had visited PCP 3 times, managed as viral infection. On 3rd visit (May 23) had BC sent, also HIV serology which was negative. She was given doxycycline, which she has taken 4 doses) and noted fading of the rash. BC from the 23rd + on PCR for N meningitidis. She has had no headache, no photophobia, no nausea or vomiting and no other meningeal signs. She has never had a similar episode in the past. BC sent and started on ceftriaxone.  -F/U susceptibility and final culture from 5/23 and repeat (drawn on doxycycline).  - Droplet precautions for first 24 hours on antibiotics  - Prophylaxis for contacts- being managed by THADDEUS  - Check terminal complement (C5-9), if low may explain her mild presentation.  - Plan to treat IV for 7 days  - Watch for fever/acute reactions during first 24 hours of treatment due to lysis of organisms  - consider home IV abx if remains asymptomatic with clearance of bacteremia    Eugenio Hooper MD  pager [phone]  office [phone]

## 2018-05-27 NOTE — H&P ADULT - PROBLEM SELECTOR PLAN 2
DVT PPx: IMPROVE Score of 0, no indication for pharmacological ppx  Diet: Regular  Dispo:    Polly Garcia PGY-1  Internal Medicine   Pager [phone] -CXR on 5/25 with 7mm lung nodule, repeat CXR 5/27 w/o lung nodule  -pt. former smoker, will discuss with thoracic radiologist next week regarding non-contrast chest CT

## 2018-05-27 NOTE — ED PROVIDER NOTE - OBJECTIVE STATEMENT
33F no pmh p/w fever and rash. Patient has had intermittent recurrent fevers since May 12 and developed a rash that started on her feet and spread to her abdomen and trunk on May 14. Associated with diffuse intermittent joint pain and muscle aches and weakness to the point where she can not lift her arms above her head or get out of bed. The weakness and aches are worse with her fever, but can occur at any time and are not activity related. She went to see her doctor 4 days ago who took blood cultures and started her on doxycycline. She was called this morning and told to come directly to the ED because she grew N Meningitis in her blood. She complains of mild intermittent R sided headache and b/l neck stiffness, but denies photophobia, vision changes, numbness/tingling, difficulty ambulating, n/v, urinary symptoms, abd pain, cp, sob, no recent travel.

## 2018-05-27 NOTE — ED PROVIDER NOTE - MEDICAL DECISION MAKING DETAILS
33F no sig pmh p/w fevers and rash in setting of positive n meningitis culture. Patient is well appearing. Low suspicion for meningitis at this time. Will do septic workup and likely a/m for IV abx.

## 2018-05-27 NOTE — CONSULT NOTE ADULT - SUBJECTIVE AND OBJECTIVE BOX
Patient is a 33y old  Female who presents with a chief complaint of fever, rash, positive outpatient blood cultures N. Meningitidis (27 May 2018 12:59)    HPI:  33F with no significant PMHx sent in by primary care physician for outpatient blood cultures positive for Neisseria Meningitidis. Pt. works as a  and had been in usual state of health when she noticed fevers to 102 starting May 12. Then starting May 14 she developed a rash, initially on her UEs that later spread to her trunk, back, and LEs. She had gone to her primary care physician, who initially suspected viral etiology and prescribed her antivirals. She returned to him on May 25 as she was not improving, and he prescribed her doxycycline and jamil blood cultures which returned positive today, and pt. advised to come to ED. She reports generalized weakness, headaches/neck stiffness around the time of May 12 but improved now, one episode of NBNB vomiting, diffuse joint pain, and travel to [place] in February. She denies cp, sob, diarrhea, dysuria, increased urinary frequency.     In ED: T 98.7 / HR 89 / /76 / RR 16 / O2 Sat 100%RA, CXR clear lungs, cultures pending (27 May 2018 12:59)      PAST MEDICAL & SURGICAL HISTORY:  No pertinent past medical history  No significant past surgical history      Social history:    FAMILY HISTORY:  No pertinent family history in first degree relatives    Allergies    No Known Allergies    Intolerances        Antimicrobials:    cefTRIAXone   IVPB 2 Gram(s) IV Intermittent every 24 hours        Vital Signs Last 24 Hrs  T(C): 36.6 (27 May 2018 07:15), Max: 37.1 (27 May 2018 07:03)  T(F): 97.9 (27 May 2018 07:15), Max: 98.7 (27 May 2018 07:03)  HR: 73 (27 May 2018 11:45) (73 - 89)  BP: 119/89 (27 May 2018 11:45) (114/81 - 130/76)  BP(mean): --  RR: 16 (27 May 2018 11:45) (16 - 16)  SpO2: 99% (27 May 2018 11:45) (98% - 100%)                              12.6   5.7   )-----------( 291      ( 27 May 2018 07:17 )             39.9         05-27    138  |  102  |  14  ----------------------------<  102<H>  4.3   |  25  |  0.72    Ca    9.3      27 May 2018 07:17    TPro  7.4  /  Alb  3.9  /  TBili  0.1<L>  /  DBili  x   /  AST  22  /  ALT  15  /  AlkPhos  51  05-27      RECENT CULTURES: Patient is a 33y old  Female who presents with a chief complaint of fever, rash, positive outpatient blood cultures N. Meningitidis (27 May 2018 12:59)    HPI:  33F with no significant PMHx sent in by primary care physician for outpatient blood cultures positive for Neisseria Meningitidis. Pt. works as a  and had been in usual state of health when she noticed fevers to 102 starting May 12. Then starting May 14 she developed a rash, initially on her UEs that later spread to her trunk, back, and LEs. She had gone to her primary care physician, who initially suspected viral etiology and prescribed her antivirals. She returned to him on May 25 as she was not improving, and he prescribed her doxycycline and jamil blood cultures which returned positive today, and pt. advised to come to ED. She reports generalized weakness, headaches/neck stiffness around the time of May 12 but improved now, one episode of NBNB vomiting, diffuse joint pain, and travel to [place] in February. She denies cp, sob, diarrhea, dysuria, increased urinary frequency.     In ED: T 98.7 / HR 89 / /76 / RR 16 / O2 Sat 100%RA, CXR clear lungs, cultures pending (27 May 2018 12:59)     Review of Systems:  Review of Systems: REVIEW OF SYSTEMS:   CONSTITUTIONAL: +weakness, +fevers or chills  EYES/ENT: +headache, No visual changes;  No vertigo or throat pain   NECK: +pain or +stiffness  RESPIRATORY: No cough, wheezing, hemoptysis; No shortness of breath  CARDIOVASCULAR: No chest pain or palpitations  GASTROINTESTINAL: No abdominal or epigastric pain. No nausea, vomiting, or hematemesis; No diarrhea or constipation. No melena or hematochezia.  GENITOURINARY: No dysuria, frequency or hematuria  NEUROLOGICAL: No numbness or weakness SKIN: No itching, +rash on b/l LEs/UEs, trunk and back	    PAST MEDICAL & SURGICAL HISTORY:  No pertinent past medical history  No significant past surgical history      Social history:  No smoking  Traveled to [place] in Feb 2018 for her weeding'  Drinks socially    FAMILY HISTORY:  No pertinent family history in first degree relatives    Allergies    No Known Allergies    Intolerances        Antimicrobials:    cefTRIAXone   IVPB 2 Gram(s) IV Intermittent every 24 hours        Vital Signs Last 24 Hrs  T(C): 36.6 (27 May 2018 07:15), Max: 37.1 (27 May 2018 07:03)  T(F): 97.9 (27 May 2018 07:15), Max: 98.7 (27 May 2018 07:03)  HR: 73 (27 May 2018 11:45) (73 - 89)  BP: 119/89 (27 May 2018 11:45) (114/81 - 130/76)  BP(mean): --  RR: 16 (27 May 2018 11:45) (16 - 16)  SpO2: 99% (27 May 2018 11:45) (98% - 100%)                              12.6   5.7   )-----------( 291      ( 27 May 2018 07:17 )             39.9         05-27    138  |  102  |  14  ----------------------------<  102<H>  4.3   |  25  |  0.72    Ca    9.3      27 May 2018 07:17    TPro  7.4  /  Alb  3.9  /  TBili  0.1<L>  /  DBili  x   /  AST  22  /  ALT  15  /  AlkPhos  51  05-27      RECENT CULTURES: Patient is a 33y old  Female who presents with a chief complaint of fever, rash, positive outpatient blood cultures N. Meningitidis (27 May 2018 12:59)    HPI:  33F with no significant PMHx sent in by primary care physician for outpatient blood cultures positive for Neisseria Meningitidis. Pt. works as a  and had been in usual state of health when she noticed fevers to 102 starting May 12. Then starting May 14 she developed a rash, initially on her UEs that later spread to her trunk, back, and LEs. She had gone to her primary care physician, who initially suspected viral etiology and prescribed her antivirals. She returned to him on May 25 as she was not improving, and he prescribed her doxycycline and jamil blood cultures which returned positive today, and pt. advised to come to ED. She reports generalized weakness, headaches/neck stiffness around the time of May 12 but improved now, one episode of NBNB vomiting, diffuse joint pain, and travel to [place] in February. She denies cp, sob, diarrhea, dysuria, increased urinary frequency.     In ED: T 98.7 / HR 89 / /76 / RR 16 / O2 Sat 100%RA, CXR clear lungs, cultures pending (27 May 2018 12:59)     Review of Systems:  Review of Systems: REVIEW OF SYSTEMS: CONSTITUTIONAL: +weakness, +fevers or chills EYES/ENT: +headache, No visual changes;  No vertigo or throat pain  NECK: +pain or +stiffness RESPIRATORY: No cough, wheezing, hemoptysis; No shortness of breath CARDIOVASCULAR: No chest pain or palpitations GASTROINTESTINAL: No abdominal or epigastric pain. No nausea, vomiting, or hematemesis; No diarrhea or constipation. No melena or hematochezia. GENITOURINARY: No dysuria, frequency or hematuria NEUROLOGICAL: No numbness or weakness SKIN: No itching, +rash on b/l LEs/UEs, trunk and back	    PAST MEDICAL & SURGICAL HISTORY:  No pertinent past medical history  No significant past surgical history      Social history:  No smoking  Traveled to [place] in Feb 2018 for her weeding'  Drinks socially    FAMILY HISTORY:  No pertinent family history in first degree relatives    Allergies  No Known Allergies    Intolerances        Antimicrobials:  cefTRIAXone   IVPB 2 Gram(s) IV Intermittent every 24 hours        Vital Signs Last 24 Hrs  T(C): 36.6 (27 May 2018 07:15), Max: 37.1 (27 May 2018 07:03)  T(F): 97.9 (27 May 2018 07:15), Max: 98.7 (27 May 2018 07:03)  HR: 73 (27 May 2018 11:45) (73 - 89)  BP: 119/89 (27 May 2018 11:45) (114/81 - 130/76)  BP(mean): --  RR: 16 (27 May 2018 11:45) (16 - 16)  SpO2: 99% (27 May 2018 11:45) (98% - 100%)  Physical Exam: General: WN/WD NAD  Neurology: A&Ox3, nonfocal, IZQUIERDO x 4  Eyes: PERRLA/ EOMI, Gross vision intact  ENT/Neck: Negative Kernig's, Neck supple, trachea midline, No JVD, Gross hearing intact  Respiratory: CTA B/L, No wheezing, rales, rhonchi  CV: RRR, S1S2, no murmurs, rubs or gallops  Abdominal: Soft, NT, ND +BS,   Extremities: No edema, + peripheral pulses  Skin: fading macular rash on b/l LEs and UEs, trunk                            12.6   5.7   )-----------( 291      ( 27 May 2018 07:17 )             39.9         05-27    138  |  102  |  14  ----------------------------<  102<H>  4.3   |  25  |  0.72    Ca    9.3      27 May 2018 07:17    TPro  7.4  /  Alb  3.9  /  TBili  0.1<L>  /  DBili  x   /  AST  22  /  ALT  15  /  AlkPhos  51  05-27      RECENT CULTURES:  Blood culture from 5/25--Niesseria meningitidis     Blood culture 5/27--In lab      RADIOLOGY  Xray Chest 2 Views PA/Lat (05.27.18 @ 08:38)  FINDINGS:   The cardiomediastinal silhouette is within normal limits.  No acute congestive changes seen.  No discrete bilateral lung nodules noted.  No bilateral focal infiltrates. No pleural effusion or pneumothorax.  The bony structures are intact.  IMPRESSION:  Clear lungs.

## 2018-05-27 NOTE — ED PROVIDER NOTE - NS ED ROS FT
CONST: +fevers - intermittent lasts for approximately 3-4 days at a time and then breaks  EYES: no pain  ENT: no sore throat   CV: no chest pain  RESP: no shortness of breath  ABD: no abdominal pain   : no dysuria  MSK: Intermittent diffuse muscle pain and weakness and joint pain  NEURO: +intermittent headache and neck stiffness. No additional neurologic complaints  HEME: no easy bleeding  SKIN:  +diffuse rash started on feet and spread to abdomen and back

## 2018-05-27 NOTE — H&P ADULT - NSHPLABSRESULTS_GEN_ALL_CORE
12.6   5.7   )-----------( 291      ( 27 May 2018 07:17 )             39.9     05-27    138  |  102  |  14  ----------------------------<  102<H>  4.3   |  25  |  0.72    Ca    9.3      27 May 2018 07:17    TPro  7.4  /  Alb  3.9  /  TBili  0.1<L>  /  DBili  x   /  AST  22  /  ALT  15  /  AlkPhos  51  05-27    Outpatient BCx 5/25 - Neisseria Meningitis

## 2018-05-27 NOTE — ED PROVIDER NOTE - SKIN, MLM
Diffuse 1cm circular macular papular blanching rash on dorsum of feet, abdomen, back, b/l legs. Skin normal color for race, warm, dry and intact.

## 2018-05-27 NOTE — H&P ADULT - PROBLEM SELECTOR PLAN 3
DVT PPx: IMPROVE Score of 0, no indication for pharmacological ppx  Diet: Regular  Dispo:    Polly Garcia PGY-1  Internal Medicine   Pager [phone] -pt. former smoker, 7 cigarettes daily, quit 7 years ago, still smokes sporadically, last 6 months ago  -d/w pt. importance of smoking cessation to reduce risk of stroke, CVAs, cancers  -pt. agreeable, will not resume sporadic smoking after discharge, does not want cessation aids (gum, patch) -pt. former smoker, 7 cigarettes daily, quit 7 years ago, still smokes sporadically, last 6 months ago  -d/w pt. importance of smoking cessation to reduce risk of stroke, cardiovascular events, malignancies  -pt. agreeable, will not resume sporadic smoking after discharge, does not want cessation aids (gum, patch)

## 2018-05-27 NOTE — ED ADULT NURSE NOTE - OBJECTIVE STATEMENT
Patient presents to the ED with c/o positive blood cultures done out patient.  Patient had fever and rash to body x3 weeks of unknown etiology.  Patient prescribed doxycyline.

## 2018-05-27 NOTE — H&P ADULT - FAMILY HISTORY
No pertinent family history in first degree relatives Father  Still living? Unknown  Family history of early CAD, Age at diagnosis: Age Unknown

## 2018-05-27 NOTE — ED PROVIDER NOTE - PROGRESS NOTE DETAILS
AG PGY1: Surgery consulted. Will come down to evaluate patient AG PGY1: Spoke to PMD Dr. Eugenio Gaspar. informed him that patient will be admitted for IV abx. Attending MD Karimi.  Case discussed with THADDEUS for ongoing eval of need for further PPX.  Pt is a .  She has no meningismus currently.  Planned abxs and admission/ID consult.  THADDEUS obtaining list of close contacts for PPX.  Stable for admission.

## 2018-05-28 DIAGNOSIS — B34.1 ENTEROVIRUS INFECTION, UNSPECIFIED: ICD-10-CM

## 2018-05-28 LAB
ANION GAP SERPL CALC-SCNC: 12 MMOL/L — SIGNIFICANT CHANGE UP (ref 5–17)
BASOPHILS # BLD AUTO: 0.1 K/UL — SIGNIFICANT CHANGE UP (ref 0–0.2)
BASOPHILS NFR BLD AUTO: 1.6 % — SIGNIFICANT CHANGE UP (ref 0–2)
BUN SERPL-MCNC: 10 MG/DL — SIGNIFICANT CHANGE UP (ref 7–23)
CALCIUM SERPL-MCNC: 9.7 MG/DL — SIGNIFICANT CHANGE UP (ref 8.4–10.5)
CHLORIDE SERPL-SCNC: 102 MMOL/L — SIGNIFICANT CHANGE UP (ref 96–108)
CO2 SERPL-SCNC: 23 MMOL/L — SIGNIFICANT CHANGE UP (ref 22–31)
CREAT SERPL-MCNC: 0.63 MG/DL — SIGNIFICANT CHANGE UP (ref 0.5–1.3)
CULTURE RESULTS: SIGNIFICANT CHANGE UP
EOSINOPHIL # BLD AUTO: 0.1 K/UL — SIGNIFICANT CHANGE UP (ref 0–0.5)
EOSINOPHIL NFR BLD AUTO: 1.3 % — SIGNIFICANT CHANGE UP (ref 0–6)
GLUCOSE SERPL-MCNC: 91 MG/DL — SIGNIFICANT CHANGE UP (ref 70–99)
HCT VFR BLD CALC: 35.7 % — SIGNIFICANT CHANGE UP (ref 34.5–45)
HGB BLD-MCNC: 12.1 G/DL — SIGNIFICANT CHANGE UP (ref 11.5–15.5)
HIV 1+2 AB+HIV1 P24 AG SERPL QL IA: SIGNIFICANT CHANGE UP
LYMPHOCYTES # BLD AUTO: 1.9 K/UL — SIGNIFICANT CHANGE UP (ref 1–3.3)
LYMPHOCYTES # BLD AUTO: 39.9 % — SIGNIFICANT CHANGE UP (ref 13–44)
MAGNESIUM SERPL-MCNC: 2.2 MG/DL — SIGNIFICANT CHANGE UP (ref 1.6–2.6)
MCHC RBC-ENTMCNC: 32.2 PG — SIGNIFICANT CHANGE UP (ref 27–34)
MCHC RBC-ENTMCNC: 33.8 GM/DL — SIGNIFICANT CHANGE UP (ref 32–36)
MCV RBC AUTO: 95.3 FL — SIGNIFICANT CHANGE UP (ref 80–100)
MONOCYTES # BLD AUTO: 0.2 K/UL — SIGNIFICANT CHANGE UP (ref 0–0.9)
MONOCYTES NFR BLD AUTO: 5.2 % — SIGNIFICANT CHANGE UP (ref 2–14)
NEUTROPHILS # BLD AUTO: 2.5 K/UL — SIGNIFICANT CHANGE UP (ref 1.8–7.4)
NEUTROPHILS NFR BLD AUTO: 52.1 % — SIGNIFICANT CHANGE UP (ref 43–77)
PHOSPHATE SERPL-MCNC: 3.6 MG/DL — SIGNIFICANT CHANGE UP (ref 2.5–4.5)
PLATELET # BLD AUTO: 285 K/UL — SIGNIFICANT CHANGE UP (ref 150–400)
POTASSIUM SERPL-MCNC: 4.5 MMOL/L — SIGNIFICANT CHANGE UP (ref 3.5–5.3)
POTASSIUM SERPL-SCNC: 4.5 MMOL/L — SIGNIFICANT CHANGE UP (ref 3.5–5.3)
RBC # BLD: 3.75 M/UL — LOW (ref 3.8–5.2)
RBC # FLD: 11.1 % — SIGNIFICANT CHANGE UP (ref 10.3–14.5)
SODIUM SERPL-SCNC: 137 MMOL/L — SIGNIFICANT CHANGE UP (ref 135–145)
SPECIMEN SOURCE: SIGNIFICANT CHANGE UP
WBC # BLD: 4.8 K/UL — SIGNIFICANT CHANGE UP (ref 3.8–10.5)
WBC # FLD AUTO: 4.8 K/UL — SIGNIFICANT CHANGE UP (ref 3.8–10.5)

## 2018-05-28 PROCEDURE — 99232 SBSQ HOSP IP/OBS MODERATE 35: CPT

## 2018-05-28 PROCEDURE — 99233 SBSQ HOSP IP/OBS HIGH 50: CPT

## 2018-05-28 RX ORDER — ACETAMINOPHEN 500 MG
650 TABLET ORAL EVERY 6 HOURS
Qty: 0 | Refills: 0 | Status: DISCONTINUED | OUTPATIENT
Start: 2018-05-28 | End: 2018-05-29

## 2018-05-28 RX ADMIN — Medication 650 MILLIGRAM(S): at 12:00

## 2018-05-28 RX ADMIN — CEFTRIAXONE 100 GRAM(S): 500 INJECTION, POWDER, FOR SOLUTION INTRAMUSCULAR; INTRAVENOUS at 09:22

## 2018-05-28 RX ADMIN — Medication 650 MILLIGRAM(S): at 11:18

## 2018-05-28 RX ADMIN — CEFTRIAXONE 100 GRAM(S): 500 INJECTION, POWDER, FOR SOLUTION INTRAMUSCULAR; INTRAVENOUS at 20:45

## 2018-05-28 NOTE — PROGRESS NOTE ADULT - PROBLEM SELECTOR PLAN 4
DVT PPx: IMPROVE Score of 0, no indication for pharmacological ppx  Diet: Regular  Dispo: Home    Polly Garcia PGY-1  Internal Medicine   Pager [phone] -pt. former smoker, 7 cigarettes daily, quit 7 years ago, still smokes sporadically, last 6 months ago  -d/w pt. importance of smoking cessation to reduce risk of stroke, cardiovascular events, malignancies  -pt. agreeable, will not resume sporadic smoking after discharge, does not want cessation aids (gum, patch)

## 2018-05-28 NOTE — PROGRESS NOTE ADULT - PROBLEM SELECTOR PLAN 1
-fevers and rash, 5/25 outpatient BCx +N. meningitidis  -possibly superimposed on enteroviral infection (RVP +), pt. may be colonized in nasopharynx  -low suspicion for CNS involvement per ID, hold off LP  -c/w ceftriaxone 2g BID day 2/7, f/u repeat BCx, HIV and terminal complements  -[facility] notified by ED, verified with Dr. Luiz Quiñones at [facility], ciprofloxacin ppx advised for pts. spouse, droplet precautions, ID recs appreciated -fevers and rash, 5/25 outpatient BCx +N. meningitidis  -low suspicion for CNS involvement per ID, hold off LP  -c/w ceftriaxone 2g BID day 2/7, f/u repeat BCx, HIV and terminal complements  -[facility] notified by ED, verified with Dr. Luiz Quiñones at [facility], ciprofloxacin ppx advised for pts. spouse, droplet precautions, ID recs appreciated

## 2018-05-28 NOTE — PROGRESS NOTE ADULT - ASSESSMENT
33F no PMHx who p/w fevers and rash found to have Neisseria Meningitidis Bacteremia on outpatient cultures.

## 2018-05-28 NOTE — PROGRESS NOTE ADULT - ASSESSMENT
33F with no significant PMH admitted after being called from PCP office for + blood cultures for N meningitidis. She had been symptomatic for 10 days, with intermittent fever (3 episodes), and a rash that was macular (several discrete spots on her LEs, neck and flank). Had visited PCP 3 times, managed as viral infection. On 3rd visit (May 23) had BC sent, also HIV serology which was negative. She was given doxycycline, which she has taken 4 doses) and noted fading of the rash. BC from the 23rd + on PCR for N meningitidis. She has had no headache, no photophobia, no nausea or vomiting and no other meningeal signs. She has never had a similar episode in the past. BC sent and started on ceftriaxone. RVP from ED also + for rhino/enterovirus  -F/U susceptibility and final culture from 5/23 and repeat (drawn on doxycycline).  - Droplet precautions for first 24 hours on antibiotics  - Prophylaxis for contacts- being managed by THADDEUS  - Check terminal complement (C5-9), if low may explain her mild presentation - pending  - Plan to treat IV for 7 days  - Watch for fever/acute reactions during first 24 hours of treatment due to lysis of organisms, tolerating well  - consider home IV abx if remains asymptomatic with clearance of bacteremia    Eugenio Hooper MD  pager [phone]  office [phone]  My colleague will cover from 5/29  33F with no significant PMH admitted after being called from PCP office for + blood cultures for N meningitidis. She had been symptomatic for 10 days, with intermittent fever (3 episodes), and a rash that was macular (several discrete spots on her LEs, neck and flank). Had visited PCP 3 times, managed as viral infection. On 3rd visit (May 23) had BC sent, also HIV serology which was negative. She was given doxycycline, which she has taken 4 doses) and noted fading of the rash. BC from the 23rd + on PCR for N meningitidis. She has had no headache, no photophobia, no nausea or vomiting and no other meningeal signs. She has never had a similar episode in the past. BC sent and started on ceftriaxone. RVP from ED also + for rhino/enterovirus  -F/U susceptibility and final culture from 5/23 and repeat (drawn on doxycycline).  - Droplet precautions for first 24 hours on antibiotics  - Prophylaxis for contacts- being managed by THADDEUS  - Check terminal complement (C5-9), if low may explain her mild presentation - pending (note:  this evaluation is best performed after convalescence)  - Plan to treat IV for 7 days  - Watch for fever/acute reactions during first 24 hours of treatment due to lysis of organisms, tolerating well  - consider home IV abx if remains asymptomatic with clearance of bacteremia    Eugenio Hooper MD  pager [phone]  office [phone]  My colleague will cover from 5/29

## 2018-05-28 NOTE — PROGRESS NOTE ADULT - PROBLEM SELECTOR PLAN 2
-CXR on 5/25 with 7mm lung nodule, repeat CXR 5/27 w/o lung nodule  -pt. former smoker, outpatient low dose CT -RVP positive for enterovirus  -supportive care, on IVF, encourage PO intake

## 2018-05-28 NOTE — PROGRESS NOTE ADULT - PROBLEM SELECTOR PLAN 3
-pt. former smoker, 7 cigarettes daily, quit 7 years ago, still smokes sporadically, last 6 months ago  -d/w pt. importance of smoking cessation to reduce risk of stroke, cardiovascular events, malignancies  -pt. agreeable, will not resume sporadic smoking after discharge, does not want cessation aids (gum, patch) -CXR on 5/25 with 7mm lung nodule, repeat CXR 5/27 w/o lung nodule  -pt. former smoker, outpatient low dose CT

## 2018-05-29 ENCOUNTER — OTHER (OUTPATIENT)
Age: 33
End: 2018-05-29

## 2018-05-29 ENCOUNTER — TRANSCRIPTION ENCOUNTER (OUTPATIENT)
Age: 33
End: 2018-05-29

## 2018-05-29 VITALS
DIASTOLIC BLOOD PRESSURE: 73 MMHG | OXYGEN SATURATION: 99 % | RESPIRATION RATE: 18 BRPM | TEMPERATURE: 98 F | HEART RATE: 82 BPM | SYSTOLIC BLOOD PRESSURE: 111 MMHG

## 2018-05-29 LAB
ANION GAP SERPL CALC-SCNC: 11 MMOL/L — SIGNIFICANT CHANGE UP (ref 5–17)
APTT BLD: 27.8 SEC — SIGNIFICANT CHANGE UP (ref 27.5–37.4)
BUN SERPL-MCNC: 12 MG/DL — SIGNIFICANT CHANGE UP (ref 7–23)
CALCIUM SERPL-MCNC: 8.9 MG/DL — SIGNIFICANT CHANGE UP (ref 8.4–10.5)
CHLORIDE SERPL-SCNC: 103 MMOL/L — SIGNIFICANT CHANGE UP (ref 96–108)
CO2 SERPL-SCNC: 24 MMOL/L — SIGNIFICANT CHANGE UP (ref 22–31)
CREAT SERPL-MCNC: 0.76 MG/DL — SIGNIFICANT CHANGE UP (ref 0.5–1.3)
GLUCOSE SERPL-MCNC: 97 MG/DL — SIGNIFICANT CHANGE UP (ref 70–99)
HCT VFR BLD CALC: 37.5 % — SIGNIFICANT CHANGE UP (ref 34.5–45)
HGB BLD-MCNC: 12.5 G/DL — SIGNIFICANT CHANGE UP (ref 11.5–15.5)
INR BLD: 1.05 RATIO — SIGNIFICANT CHANGE UP (ref 0.88–1.16)
MAGNESIUM SERPL-MCNC: 2.3 MG/DL — SIGNIFICANT CHANGE UP (ref 1.6–2.6)
MCHC RBC-ENTMCNC: 31.9 PG — SIGNIFICANT CHANGE UP (ref 27–34)
MCHC RBC-ENTMCNC: 33.2 GM/DL — SIGNIFICANT CHANGE UP (ref 32–36)
MCV RBC AUTO: 95.9 FL — SIGNIFICANT CHANGE UP (ref 80–100)
PHOSPHATE SERPL-MCNC: 4 MG/DL — SIGNIFICANT CHANGE UP (ref 2.5–4.5)
PLATELET # BLD AUTO: 307 K/UL — SIGNIFICANT CHANGE UP (ref 150–400)
POTASSIUM SERPL-MCNC: 4.3 MMOL/L — SIGNIFICANT CHANGE UP (ref 3.5–5.3)
POTASSIUM SERPL-SCNC: 4.3 MMOL/L — SIGNIFICANT CHANGE UP (ref 3.5–5.3)
PROTHROM AB SERPL-ACNC: 11.5 SEC — SIGNIFICANT CHANGE UP (ref 9.8–12.7)
R RICKETTSI IGG CSF-ACNC: NEGATIVE
R RICKETTSI IGM CSF-ACNC: 0.38 INDEX
RBC # BLD: 3.91 M/UL — SIGNIFICANT CHANGE UP (ref 3.8–5.2)
RBC # FLD: 11.1 % — SIGNIFICANT CHANGE UP (ref 10.3–14.5)
SODIUM SERPL-SCNC: 138 MMOL/L — SIGNIFICANT CHANGE UP (ref 135–145)
WBC # BLD: 5.5 K/UL — SIGNIFICANT CHANGE UP (ref 3.8–10.5)
WBC # FLD AUTO: 5.5 K/UL — SIGNIFICANT CHANGE UP (ref 3.8–10.5)

## 2018-05-29 PROCEDURE — 87798 DETECT AGENT NOS DNA AMP: CPT

## 2018-05-29 PROCEDURE — 87486 CHLMYD PNEUM DNA AMP PROBE: CPT

## 2018-05-29 PROCEDURE — 84132 ASSAY OF SERUM POTASSIUM: CPT

## 2018-05-29 PROCEDURE — 87086 URINE CULTURE/COLONY COUNT: CPT

## 2018-05-29 PROCEDURE — 82435 ASSAY OF BLOOD CHLORIDE: CPT

## 2018-05-29 PROCEDURE — 85730 THROMBOPLASTIN TIME PARTIAL: CPT

## 2018-05-29 PROCEDURE — 99285 EMERGENCY DEPT VISIT HI MDM: CPT | Mod: 25

## 2018-05-29 PROCEDURE — 80048 BASIC METABOLIC PNL TOTAL CA: CPT

## 2018-05-29 PROCEDURE — 85652 RBC SED RATE AUTOMATED: CPT

## 2018-05-29 PROCEDURE — 86140 C-REACTIVE PROTEIN: CPT

## 2018-05-29 PROCEDURE — 85014 HEMATOCRIT: CPT

## 2018-05-29 PROCEDURE — 99233 SBSQ HOSP IP/OBS HIGH 50: CPT | Mod: GC

## 2018-05-29 PROCEDURE — 85027 COMPLETE CBC AUTOMATED: CPT

## 2018-05-29 PROCEDURE — 82947 ASSAY GLUCOSE BLOOD QUANT: CPT

## 2018-05-29 PROCEDURE — C1751: CPT

## 2018-05-29 PROCEDURE — 80053 COMPREHEN METABOLIC PANEL: CPT

## 2018-05-29 PROCEDURE — 87040 BLOOD CULTURE FOR BACTERIA: CPT

## 2018-05-29 PROCEDURE — 87581 M.PNEUMON DNA AMP PROBE: CPT

## 2018-05-29 PROCEDURE — 71046 X-RAY EXAM CHEST 2 VIEWS: CPT

## 2018-05-29 PROCEDURE — 81001 URINALYSIS AUTO W/SCOPE: CPT

## 2018-05-29 PROCEDURE — 96374 THER/PROPH/DIAG INJ IV PUSH: CPT

## 2018-05-29 PROCEDURE — 82550 ASSAY OF CK (CPK): CPT

## 2018-05-29 PROCEDURE — 83605 ASSAY OF LACTIC ACID: CPT

## 2018-05-29 PROCEDURE — 83735 ASSAY OF MAGNESIUM: CPT

## 2018-05-29 PROCEDURE — 36569 INSJ PICC 5 YR+ W/O IMAGING: CPT

## 2018-05-29 PROCEDURE — 82803 BLOOD GASES ANY COMBINATION: CPT

## 2018-05-29 PROCEDURE — 86160 COMPLEMENT ANTIGEN: CPT

## 2018-05-29 PROCEDURE — 99232 SBSQ HOSP IP/OBS MODERATE 35: CPT

## 2018-05-29 PROCEDURE — 87633 RESP VIRUS 12-25 TARGETS: CPT

## 2018-05-29 PROCEDURE — 84295 ASSAY OF SERUM SODIUM: CPT

## 2018-05-29 PROCEDURE — 82330 ASSAY OF CALCIUM: CPT

## 2018-05-29 PROCEDURE — 85610 PROTHROMBIN TIME: CPT

## 2018-05-29 PROCEDURE — 87389 HIV-1 AG W/HIV-1&-2 AB AG IA: CPT

## 2018-05-29 PROCEDURE — 84100 ASSAY OF PHOSPHORUS: CPT

## 2018-05-29 RX ORDER — CEFTRIAXONE 500 MG/1
2 INJECTION, POWDER, FOR SOLUTION INTRAMUSCULAR; INTRAVENOUS
Qty: 2 | Refills: 0 | OUTPATIENT
Start: 2018-05-29 | End: 2018-06-01

## 2018-05-29 RX ORDER — CEFTRIAXONE 500 MG/1
2 INJECTION, POWDER, FOR SOLUTION INTRAMUSCULAR; INTRAVENOUS EVERY 12 HOURS
Qty: 0 | Refills: 0 | Status: DISCONTINUED | OUTPATIENT
Start: 2018-05-29 | End: 2018-05-29

## 2018-05-29 RX ADMIN — CEFTRIAXONE 100 GRAM(S): 500 INJECTION, POWDER, FOR SOLUTION INTRAMUSCULAR; INTRAVENOUS at 17:17

## 2018-05-29 RX ADMIN — CEFTRIAXONE 100 GRAM(S): 500 INJECTION, POWDER, FOR SOLUTION INTRAMUSCULAR; INTRAVENOUS at 08:54

## 2018-05-29 NOTE — DISCHARGE NOTE ADULT - CARE PLAN
Principal Discharge DX:	Bacteremia  Goal:	complete antibiotics and follow up with Infectious Disease  Assessment and plan of treatment:	You had outpatient blood cultures positive for Neisseria meningitidis so you were treated with antibiotics. A midline was placed, please complete these antibiotics through 6/2 and follow up with infectious disease.

## 2018-05-29 NOTE — PROGRESS NOTE ADULT - PROBLEM SELECTOR PLAN 1
-fevers and rash for 2 weeks, 5/25 outpatient BCx +N. meningitidis  -low suspicion for CNS involvement per ID, hold off LP  -c/w ceftriaxone 2g BID day 3/7, repeat BCx NTD, HIV (-), f/u terminal complements  -[facility] notified (Dr. Luiz Quiñones) ciprofloxacin ppx advised for spouse  -droplet precautions for first 24 hours, will arrange for PICC, ID recs appreciated

## 2018-05-29 NOTE — DISCHARGE NOTE ADULT - HOSPITAL COURSE
33F with no significant PMHx sent in by primary care physician for outpatient blood cultures positive for Neisseria Meningitidis. Pt. works as a  and had been in usual state of health when she noticed fevers to 102 starting May 12. Then starting May 14 she developed a rash, initially on her UEs that later spread to her trunk, back, and LEs. She had gone to her primary care physician, who initially suspected viral etiology and prescribed her antivirals. She returned to him on May 25 as she was not improving, and he prescribed her doxycycline and jamil blood cultures which returned positive, and pt. was advised to come to ED. She was started on ceftriaxone 2g BID, and [facility] was notified (d/w Dr. Luiz Medina). Pts. spouse wad advised to get ppx with ciprofloxacin. Repeat blood cultures were negative, and a midline was placed for home IV abx for total 7 days treatment.     Pt. is stable and safe for discharge home with follow up with infectious disease.

## 2018-05-29 NOTE — DISCHARGE NOTE ADULT - CARE PROVIDER_API CALL
Lucia Black), Infectious Disease; Internal Medicine  [address] [place] [address]  Phone: [phone]  Fax: [phone]

## 2018-05-29 NOTE — DISCHARGE NOTE ADULT - CONDITIONS AT DISCHARGE
pt being dc'd home with antibiotics  Midline placed this am..  home care and infusion care set by care coordination  .  will begin tomorrow.  pt A&O x's 4,  vitals stable  ambulating independently , tolerating diet,  no c/o  pain  and voiding adequate urine  periferal iv dc'd   this am  no s/s of infection or infiltration

## 2018-05-29 NOTE — PROGRESS NOTE ADULT - PROBLEM SELECTOR PLAN 3
-CXR on 5/25 with 7mm lung nodule, repeat CXR 5/27 w/o lung nodule  -pt. former smoker, outpatient low dose CT

## 2018-05-29 NOTE — PROGRESS NOTE ADULT - PROBLEM SELECTOR PLAN 4
-pt. former smoker, 7 cigarettes daily, quit 7 years ago, still smokes sporadically, last 6 months ago  -d/w pt. importance of smoking cessation to reduce risk of stroke, cardiovascular events, malignancies  -pt. agreeable, will not resume sporadic smoking after discharge, does not want cessation aids (gum, patch)

## 2018-05-29 NOTE — PROGRESS NOTE ADULT - ATTENDING COMMENTS
Lucia Barba  Pager: [phone]. If no response or past 5 pm call [phone].
Patient to get PICC placed today.  Ceftriaxone for total 7 days.   D/C home once PICC placed and home abx set up.
Agree w/ above.   Well appearing today, resolution of rash, asymptomatic. Please ensure no IVDU prior to placing PICC.   Cultures NGTD.  Safe sex counseling when parents not present.  Outpatient f/u of CT lung nodule, ensure cipro ppx for partner happens please.

## 2018-05-29 NOTE — DISCHARGE NOTE ADULT - NS AS DC PROVIDER CONTACT Y/N MULTI
Patient Information     Patient Name MRN Sex Jackie Kemp [id number] Female 1968      Telephone Encounter by Jeannette Grady LPN at 2017 11:54 AM     Author:  Jeannette Grady LPN Service:  (none) Author Type:  NURS- Licensed Practical Nurse     Filed:  2017 11:55 AM Encounter Date:  10/30/2017 Status:  Signed     :  Jeannette Grady LPN (NURS- Licensed Practical Nurse)            [facility] pharmacy was contacted and informed that a new RX was sent in to the pharmacy.  Jeannette Grady LPN......2017  11:55 AM           Yes

## 2018-05-29 NOTE — PROGRESS NOTE ADULT - PROBLEM SELECTOR PLAN 5
DVT PPx: IMPROVE Score of 0, no indication for pharmacological ppx  Diet: Regular  Dispo: Home    Polly Garcia PGY-1  Internal Medicine   Pager [phone]
DVT PPx: IMPROVE Score of 0, no indication for pharmacological ppx  Diet: Regular  Dispo: Home    Polly Garcia PGY-1  Internal Medicine   Pager [phone]

## 2018-05-29 NOTE — PROGRESS NOTE ADULT - ASSESSMENT
33F with no significant PMH admitted after being called from PCP office for + blood cultures for N meningitidis.   Viral infection with rhino/enterovirus  Repeat blood cultures NTD.     Plan:   - Continue with Ceftriaxone 2 gm iv q12h   - S/p rt arm PICC.   - Prophylaxis for contacts- being managed by THADDEUS  - Terminal complement (C5-9) level in lab, ?? significance in setting of acute infection.   - Plan to treat IV for 7 days until 6/2/18

## 2018-05-29 NOTE — DISCHARGE NOTE ADULT - PLAN OF CARE
complete antibiotics and follow up with Infectious Disease You had outpatient blood cultures positive for Neisseria meningitidis so you were treated with antibiotics. A midline was placed, please complete these antibiotics through 6/2 and follow up with infectious disease.

## 2018-05-29 NOTE — DISCHARGE NOTE ADULT - PATIENT PORTAL LINK FT
You can access the [facility] Patient Portal, offered by [facility], by registering with the following website: [email or web address]

## 2018-05-29 NOTE — PROGRESS NOTE ADULT - SUBJECTIVE AND OBJECTIVE BOX
Patient is a 33y old  Female who presents with a chief complaint of fever, rash, positive outpatient blood cultures N. Meningitidis (27 May 2018 12:59)    SUBJECTIVE / OVERNIGHT EVENTS: No acute events overnight, pt. feels well this morning. Denies fevers, chills, dizziness, cp, sob, n/v/d.     MEDICATIONS  (STANDING):  cefTRIAXone   IVPB 2 Gram(s) IV Intermittent every 12 hours  lactated ringers. 1000 milliLiter(s) (100 mL/Hr) IV Continuous <Continuous>    MEDICATIONS  (PRN):    Vital Signs Last 24 Hrs  T(C): 36.9 (28 May 2018 06:28), Max: 36.9 (28 May 2018 06:28)  T(F): 98.4 (28 May 2018 06:28), Max: 98.4 (28 May 2018 06:28)  HR: 71 (28 May 2018 06:28) (70 - 79)  BP: 113/77 (28 May 2018 06:28) (107/71 - 119/89)  RR: 18 (28 May 2018 06:28) (16 - 18)  SpO2: 98% (28 May 2018 06:28) (98% - 99%)  CAPILLARY BLOOD GLUCOSE    I&O's Summary    27 May 2018 07:01  -  28 May 2018 07:00  --------------------------------------------------------  IN: 1500 mL / OUT: 0 mL / NET: 1500 mL    PHYSICAL EXAM:  GENERAL: NAD, well-developed  HEAD:  Atraumatic, Normocephalic  EYES: EOMI, PERRLA, conjunctiva and sclera clear  NECK: Supple, No JVD  CHEST/LUNG: Clear to auscultation bilaterally; No wheeze  HEART: Regular rate and rhythm; No murmurs, rubs, or gallops  ABDOMEN: Soft, Nontender, Nondistended; Bowel sounds present  EXTREMITIES:  2+ Peripheral Pulses, No clubbing, cyanosis, or edema  PSYCH: AAOx3  NEUROLOGY: non-focal  SKIN: purpuric rash noted yesterday on b/l upper and lower extremities, trunk and back much improved    LABS:                        12.6   5.7   )-----------( 291      ( 27 May 2018 07:17 )             39.9         138  |  102  |  14  ----------------------------<  102<H>  4.3   |  25  |  0.72    Ca    9.3      27 May 2018 07:17    TPro  7.4  /  Alb  3.9  /  TBili  0.1<L>  /  DBili  x   /  AST  22  /  ALT  15  /  AlkPhos  51      PT/INR - ( 27 May 2018 08:08 )   PT: 11.8 sec;   INR: 1.09 ratio         PTT - ( 27 May 2018 08:08 )  PTT:27.6 sec  CARDIAC MARKERS ( 27 May 2018 07:17 )  x     / x     / 51 U/L / x     / x        Urinalysis Basic - ( 27 May 2018 08:24 )    Color: PL Yellow / Appearance: SL Turbid / S.008 / pH: x  Gluc: x / Ketone: Negative  / Bili: Negative / Urobili: Negative   Blood: x / Protein: Negative / Nitrite: Negative   Leuk Esterase: Small / RBC: 0-2 /HPF / WBC 0-2 /HPF   Sq Epi: x / Non Sq Epi: Moderate /HPF / Bacteria: Few /HPF    RADIOLOGY & ADDITIONAL TESTS:    Imaging Personally Reviewed: yes    Consultant(s) Notes Reviewed: yes    Care Discussed with Consultants/Other Providers: yes
33y old  Female who presents with a chief complaint of fever, rash, positive outpatient blood cultures N. Meningitidis (29 May 2018 13:43)      Interval history:  Afebrile, feels well, no headaches, no neck stiffness, rash improved.     No Known Allergies    Antimicrobials:    cefTRIAXone   IVPB 2 Gram(s) IV Intermittent every 12 hours    REVIEW OF SYSTEMS:  No chest pain or palpitations  No cough, no SOB  No N/V/D, no abdominal pain  No dysuria or frequency       Vital Signs Last 24 Hrs  T(C): 36.7 (05-29-18 @ 14:09), Max: 37 (05-29-18 @ 01:45)  T(F): 98 (05-29-18 @ 14:09), Max: 98.6 (05-29-18 @ 01:45)  HR: 82 (05-29-18 @ 14:09) (66 - 82)  BP: 111/73 (05-29-18 @ 14:09) (102/69 - 112/76)  RR: 18 (05-29-18 @ 14:09) (18 - 18)  SpO2: 99% (05-29-18 @ 14:09) (95% - 99%)    PHYSICAL EXAM:  Patient in no acute distress. AAOX3.  No icterus, no oral ulcers.  Cardiovascular: S1S2 normal.  Lungs: Good air entry B/L lung fields.  Gastrointestinal: soft, nontender, nondistended.  Extremities: Faint rash b/l upper and lower extremities   IV sites not inflamed.                           12.5   5.5   )-----------( 307      ( 29 May 2018 06:34 )             37.5   05-29    138  |  103  |  12  ----------------------------<  97  4.3   |  24  |  0.76    Ca    8.9      29 May 2018 06:34  Phos  4.0     05-29  Mg     2.3     05-29          Culture - Urine (collected 27 May 2018 09:21)  Source: .Urine Clean Catch (Midstream)  Final Report (28 May 2018 11:40):    Culture grew 3 or more types of organisms which indicate    collection contamination; consider recollection only if clinically    indicated.    Culture - Blood (collected 27 May 2018 08:46)  Source: .Blood Blood-Peripheral  Preliminary Report (28 May 2018 09:01):    No growth to date.    Culture - Blood (collected 27 May 2018 08:46)  Source: .Blood Blood-Peripheral  Preliminary Report (28 May 2018 09:01):    No growth to date.
Patient is a 33y old  Female who presents with a chief complaint of fever, rash, positive outpatient blood cultures N. Meningitidis (27 May 2018 12:59)    Being followed by ID for N meningitidis bacteremia    Interval history:  RVP + for rhino/entero virus  Afebrile  No acute events      ROS:  No headache, photophobia, neck stiffness, cough, SOB, CP  No N/V/D./abd pain  No other complaints      Antimicrobials:    cefTRIAXone   IVPB 2 Gram(s) IV Intermittent every 12 hours      Vital Signs Last 24 Hrs  T(C): 36.9 (05-28-18 @ 06:28), Max: 37.1 (05-27-18 @ 07:03)  T(F): 98.4 (05-28-18 @ 06:28), Max: 98.7 (05-27-18 @ 07:03)  HR: 71 (05-28-18 @ 06:28) (70 - 89)  BP: 113/77 (05-28-18 @ 06:28) (107/71 - 130/76)  BP(mean): --  RR: 18 (05-28-18 @ 06:28) (16 - 18)  SpO2: 98% (05-28-18 @ 06:28) (98% - 100%)    Physical Exam:    Constitutional well preserved, comfortable, pleasant    HEENT PERRLA EOMI, No pallor or icterus    No oral exudate or erythema    Neck supple no JVD or LN    Chest Good AE, CTA    CVS RRR S1 S2 WNl No murmur or rub or gallop    Abd soft BS normal No tenderness no masses    Ext No cyanosis clubbing or edema    IV site no erythema tenderness or discharge    Joints no swelling or LOM    Skin, erythematous macular rash fading    CNS AAO X 3 non focal    Lab Data:                          12.6   5.7   )-----------( 291      ( 27 May 2018 07:17 )             39.9       05-27    138  |  102  |  14  ----------------------------<  102<H>  4.3   |  25  |  0.72    Ca    9.3      27 May 2018 07:17    TPro  7.4  /  Alb  3.9  /  TBili  0.1<L>  /  DBili  x   /  AST  22  /  ALT  15  /  AlkPhos  51  05-27        < from: Xray Chest 2 Views PA/Lat (05.27.18 @ 08:38) >    EXAM:  XR CHEST PA LAT 2V                            PROCEDURE DATE:  05/27/2018            INTERPRETATION:  DATE OF STUDY: 5/27/18.    COMPARISON: 5/25/18.    CLINICAL INDICATION: 33-yo-female patient- assess for pneumonia.    TECHNIQUE: PA and lateral chest films.     FINDINGS:   The cardiomediastinal silhouette is within normal limits.  No acute congestive changes seen.  No discrete bilateral lung nodules noted.  No bilateral focal infiltrates. No pleural effusion or pneumothorax.  The bony structures are intact.    IMPRESSION:  Clear lungs.           < end of copied text >
Patient is a 33y old  Female who presents with a chief complaint of fever, rash, positive outpatient blood cultures N. Meningitidis (27 May 2018 12:59)    SUBJECTIVE / OVERNIGHT EVENTS: No acute events overnight. Pt. feels well this morning, no fevers, rash has improved, denies n/v/d.    MEDICATIONS  (STANDING):  cefTRIAXone   IVPB 2 Gram(s) IV Intermittent every 12 hours    MEDICATIONS  (PRN):  acetaminophen   Tablet. 650 milliGRAM(s) Oral every 6 hours PRN mild to moderate pain      Vital Signs Last 24 Hrs  T(C): 36.9 (29 May 2018 06:15), Max: 37 (29 May 2018 01:45)  T(F): 98.4 (29 May 2018 06:15), Max: 98.6 (29 May 2018 01:45)  HR: 66 (29 May 2018 06:15) (66 - 80)  BP: 108/68 (29 May 2018 06:15) (103/71 - 112/76)  BP(mean): --  RR: 18 (29 May 2018 06:15) (18 - 18)  SpO2: 98% (29 May 2018 06:15) (97% - 98%)  CAPILLARY BLOOD GLUCOSE      I&O's Summary    28 May 2018 07:01  -  29 May 2018 07:00  --------------------------------------------------------  IN: 1520 mL / OUT: 0 mL / NET: 1520 mL    PHYSICAL EXAM:  GENERAL: NAD, well-developed  HEAD:  Atraumatic, Normocephalic  EYES: EOMI, PERRLA, conjunctiva and sclera clear  NECK: Supple, No JVD  CHEST/LUNG: Clear to auscultation bilaterally; No wheeze  HEART: Regular rate and rhythm; No murmurs, rubs, or gallops  ABDOMEN: Soft, Nontender, Nondistended; Bowel sounds present  EXTREMITIES:  2+ Peripheral Pulses, No clubbing, cyanosis, or edema  PSYCH: AAOx3  NEUROLOGY: non-focal  SKIN: mild purpuric rash on b/l UE and LEs much improved since admission or lesions    LABS:                        12.1   4.8   )-----------( 285      ( 28 May 2018 09:45 )             35.7         137  |  102  |  10  ----------------------------<  91  4.5   |  23  |  0.63    Ca    9.7      28 May 2018 09:42  Phos  3.6       Mg     2.2         TPro  7.4  /  Alb  3.9  /  TBili  0.1<L>  /  DBili  x   /  AST  22  /  ALT  15  /  AlkPhos  51  -    PT/INR - ( 27 May 2018 08:08 )   PT: 11.8 sec;   INR: 1.09 ratio         PTT - ( 27 May 2018 08:08 )  PTT:27.6 sec  CARDIAC MARKERS ( 27 May 2018 07:17 )  x     / x     / 51 U/L / x     / x          Urinalysis Basic - ( 27 May 2018 08:24 )    Color: PL Yellow / Appearance: SL Turbid / S.008 / pH: x  Gluc: x / Ketone: Negative  / Bili: Negative / Urobili: Negative   Blood: x / Protein: Negative / Nitrite: Negative   Leuk Esterase: Small / RBC: 0-2 /HPF / WBC 0-2 /HPF   Sq Epi: x / Non Sq Epi: Moderate /HPF / Bacteria: Few /HPF      RADIOLOGY & ADDITIONAL TESTS:    Imaging Personally Reviewed: yes    Consultant(s) Notes Reviewed: yes    Care Discussed with Consultants/Other Providers: yes

## 2018-05-29 NOTE — DISCHARGE NOTE ADULT - MEDICATION SUMMARY - MEDICATIONS TO TAKE
I will START or STAY ON the medications listed below when I get home from the hospital:    cefTRIAXone 2 g intravenous injection  -- 2 gram(s) intravenously 2 times a day for 4 more days, through 6/2    Dx: Nesseria Meningitdis Bacteremia  -- Indication: For Neisseria meningitidis Bacteremia

## 2018-05-29 NOTE — DISCHARGE NOTE ADULT - INSTRUCTIONS
call pmd if you have fever greater than 100 degrees, if you have pain unrelieved by medication ,  if your  IV site becomes red, swollen, warm to touch,  if you see any oozing from site, or if you see fresh red blood.  call if you have difficulty urinating or if you have nausea or vomiting

## 2018-05-30 LAB
BACTERIA BLD CULT: ABNORMAL
C5 SERPL-MCNC: 25.1 MG/DL — SIGNIFICANT CHANGE UP (ref 10.6–26.3)
C6 SERPL-MCNC: 63 U/ML — HIGH (ref 32–57)
C7 SERPL-MCNC: 59 U/ML — SIGNIFICANT CHANGE UP (ref 36–60)
C8 SERPL-MCNC: 65 U/ML — HIGH (ref 33–58)
C9 SERPL-MCNC: 64 U/ML — HIGH (ref 37–61)

## 2018-06-05 ENCOUNTER — APPOINTMENT (OUTPATIENT)
Dept: CT IMAGING | Facility: CLINIC | Age: 33
End: 2018-06-05
Payer: COMMERCIAL

## 2018-06-05 ENCOUNTER — OUTPATIENT (OUTPATIENT)
Dept: OUTPATIENT SERVICES | Facility: HOSPITAL | Age: 33
LOS: 1 days | End: 2018-06-05
Payer: COMMERCIAL

## 2018-06-05 ENCOUNTER — APPOINTMENT (OUTPATIENT)
Dept: INTERNAL MEDICINE | Facility: CLINIC | Age: 33
End: 2018-06-05

## 2018-06-05 ENCOUNTER — APPOINTMENT (OUTPATIENT)
Dept: INTERNAL MEDICINE | Facility: CLINIC | Age: 33
End: 2018-06-05
Payer: COMMERCIAL

## 2018-06-05 VITALS
OXYGEN SATURATION: 100 % | TEMPERATURE: 98.7 F | HEART RATE: 75 BPM | DIASTOLIC BLOOD PRESSURE: 80 MMHG | SYSTOLIC BLOOD PRESSURE: 100 MMHG | BODY MASS INDEX: 26.2 KG/M2 | WEIGHT: 163 LBS | HEIGHT: 66 IN

## 2018-06-05 DIAGNOSIS — R50.9 FEVER, UNSPECIFIED: ICD-10-CM

## 2018-06-05 PROCEDURE — 36415 COLL VENOUS BLD VENIPUNCTURE: CPT

## 2018-06-05 PROCEDURE — 71250 CT THORAX DX C-: CPT

## 2018-06-05 PROCEDURE — 71250 CT THORAX DX C-: CPT | Mod: 26

## 2018-06-05 PROCEDURE — 99496 TRANSJ CARE MGMT HIGH F2F 7D: CPT | Mod: 25

## 2018-06-05 NOTE — PHYSICAL EXAM
[No Acute Distress] : no acute distress [Well Nourished] : well nourished [Well Developed] : well developed [Well-Appearing] : well-appearing [PERRL] : pupils equal round and reactive to light [EOMI] : extraocular movements intact [Normal Oropharynx] : the oropharynx was normal [Normal TMs] : both tympanic membranes were normal [Supple] : supple [No Lymphadenopathy] : no lymphadenopathy [No Respiratory Distress] : no respiratory distress  [Clear to Auscultation] : lungs were clear to auscultation bilaterally [Normal Rate] : normal rate  [Regular Rhythm] : with a regular rhythm [Normal S1, S2] : normal S1 and S2 [No Murmur] : no murmur heard [No Edema] : there was no peripheral edema [No Spinal Tenderness] : no spinal tenderness [No Joint Swelling] : no joint swelling [No Rash] : no rash [Normal Gait] : normal gait [Normal Affect] : the affect was normal [Normal Mood] : the mood was normal [de-identified] : scattered macular rash have resolved  [High Complexity requires an extensive number of possible diagnoses and/or the management options, extensive complexity of the medical data (tests, etc.) to be reviewed, and a high risk of significant complications, morbidity, and/or mortality as well as c] : High Complexity

## 2018-06-05 NOTE — REVIEW OF SYSTEMS
[Fatigue] : fatigue [Fever] : no fever [Chills] : no chills [Earache] : no earache [Chest Pain] : no chest pain [Lower Ext Edema] : no lower extremity edema [Shortness Of Breath] : no shortness of breath [Cough] : no cough [Abdominal Pain] : no abdominal pain [Nausea] : no nausea [Diarrhea] : diarrhea [Dysuria] : no dysuria [Frequency] : no frequency [Joint Pain] : no joint pain [Muscle Pain] : no muscle pain [Back Pain] : no back pain [Itching] : no itching [Skin Rash] : no skin rash [FreeTextEntry2] : fever resolved [de-identified] : rash resolved

## 2018-06-05 NOTE — HISTORY OF PRESENT ILLNESS
[Post-hospitalization from ___ Hospital] : Post-hospitalization from [unfilled] Hospital [Admitted on: ___] : The patient was admitted on [unfilled] [Discharged on ___] : discharged on [unfilled] [Discharge Summary] : discharge summary [Pertinent Labs] : pertinent labs [Discharge Med List] : discharge medication list [Med Reconciliation] : medication reconciliation has been completed [Radiology Findings] : radiology findings [Patient Contacted By: ____] : and contacted by [unfilled] [FreeTextEntry2] : Patient was admitted to [facility] for Neisseria Meningitidis bacteremia on 5/27. She was followed by infectious disease group. The etiology of the bacteremia was unknown. Complement levels 5-9 were not deficient. There was never suspicion for meningitis. She did not require an LP. She was treated with IV Ceftriaxone. Repeat blood cultures in hospital were negative. She received a PICC line and was discharged home on 5/29. She completed the antibiotics on 6/2. The PICC line was then removed by a visiting nurse at home. \par \par Patient comes today for hospital follow-up. She reports feeling 100% better, back to her normal self. Her scattered painful rash has resolved. She is no longer febrile. Her fatigue and joint pains have resolved. She continues to check her temps at home. She has returned back to work. 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

## 2018-06-05 NOTE — ASSESSMENT
[FreeTextEntry1] : 1. Neisseria meningitidis bacteremia: etiology unknown. Repeat blood cultures were negative. Patient completed 1 week of Ceftriaxone. She has 100% improved. She is back to work. Her rash had resolved. Will check routine labs today including CBC, CMP, ESR, and CRP. Will need repeat complement levels (C5-C9) in 3 months as per ID recommendation. \par \par 2. Initial outpatient CXR on 5/25 showed 7 mm right lung nodule. Repeat CXR in [facility] did not mention of any nodules. CT chest was performed earlier today for evaluation of right lung nodule.

## 2018-06-06 ENCOUNTER — MOBILE ON CALL (OUTPATIENT)
Age: 33
End: 2018-06-06

## 2018-06-07 ENCOUNTER — MOBILE ON CALL (OUTPATIENT)
Age: 33
End: 2018-06-07

## 2018-06-08 ENCOUNTER — TRANSCRIPTION ENCOUNTER (OUTPATIENT)
Age: 33
End: 2018-06-08

## 2018-06-08 LAB
ALBUMIN SERPL ELPH-MCNC: 3.9 G/DL
ALP BLD-CCNC: 37 U/L
ALT SERPL-CCNC: 15 U/L
ANION GAP SERPL CALC-SCNC: 16 MMOL/L
AST SERPL-CCNC: 23 U/L
BASOPHILS # BLD AUTO: 0.04 K/UL
BASOPHILS NFR BLD AUTO: 0.7 %
BILIRUB SERPL-MCNC: 0.2 MG/DL
BUN SERPL-MCNC: 16 MG/DL
CALCIUM SERPL-MCNC: 9.2 MG/DL
CHLORIDE SERPL-SCNC: 100 MMOL/L
CO2 SERPL-SCNC: 23 MMOL/L
CREAT SERPL-MCNC: 0.62 MG/DL
CRP SERPL-MCNC: 0.7 MG/DL
EOSINOPHIL # BLD AUTO: 0.04 K/UL
EOSINOPHIL NFR BLD AUTO: 0.7 %
ERYTHROCYTE [SEDIMENTATION RATE] IN BLOOD BY WESTERGREN METHOD: 9 MM/HR
GLUCOSE SERPL-MCNC: 83 MG/DL
HCT VFR BLD CALC: 36.2 %
HGB BLD-MCNC: 11.4 G/DL
IMM GRANULOCYTES NFR BLD AUTO: 0.2 %
LYMPHOCYTES # BLD AUTO: 2.37 K/UL
LYMPHOCYTES NFR BLD AUTO: 42.4 %
MAN DIFF?: NORMAL
MCHC RBC-ENTMCNC: 30.8 PG
MCHC RBC-ENTMCNC: 31.5 GM/DL
MCV RBC AUTO: 97.8 FL
MONOCYTES # BLD AUTO: 0.37 K/UL
MONOCYTES NFR BLD AUTO: 6.6 %
NEUTROPHILS # BLD AUTO: 2.76 K/UL
NEUTROPHILS NFR BLD AUTO: 49.4 %
PLATELET # BLD AUTO: 340 K/UL
POTASSIUM SERPL-SCNC: 4 MMOL/L
PROT SERPL-MCNC: 7 G/DL
RBC # BLD: 3.7 M/UL
RBC # FLD: 12.6 %
SODIUM SERPL-SCNC: 139 MMOL/L
WBC # FLD AUTO: 5.59 K/UL

## 2018-06-09 ENCOUNTER — TRANSCRIPTION ENCOUNTER (OUTPATIENT)
Age: 33
End: 2018-06-09

## 2018-06-09 LAB
FERRITIN SERPL-MCNC: 34 NG/ML
FOLATE SERPL-MCNC: 16.5 NG/ML
IRON SATN MFR SERPL: 16 %
IRON SERPL-MCNC: 62 UG/DL
TIBC SERPL-MCNC: 380 UG/DL
UIBC SERPL-MCNC: 318 UG/DL
VIT B12 SERPL-MCNC: 821 PG/ML

## 2018-06-14 ENCOUNTER — MOBILE ON CALL (OUTPATIENT)
Age: 33
End: 2018-06-14

## 2018-06-14 ENCOUNTER — TRANSCRIPTION ENCOUNTER (OUTPATIENT)
Age: 33
End: 2018-06-14

## 2018-09-25 ENCOUNTER — APPOINTMENT (OUTPATIENT)
Dept: INTERNAL MEDICINE | Facility: CLINIC | Age: 33
End: 2018-09-25

## 2018-11-20 ENCOUNTER — LABORATORY RESULT (OUTPATIENT)
Age: 33
End: 2018-11-20

## 2018-11-20 ENCOUNTER — APPOINTMENT (OUTPATIENT)
Dept: INTERNAL MEDICINE | Facility: CLINIC | Age: 33
End: 2018-11-20
Payer: COMMERCIAL

## 2018-11-20 VITALS
TEMPERATURE: 97.8 F | HEART RATE: 74 BPM | OXYGEN SATURATION: 97 % | DIASTOLIC BLOOD PRESSURE: 64 MMHG | SYSTOLIC BLOOD PRESSURE: 108 MMHG | RESPIRATION RATE: 12 BRPM

## 2018-11-20 DIAGNOSIS — Z23 ENCOUNTER FOR IMMUNIZATION: ICD-10-CM

## 2018-11-20 DIAGNOSIS — D64.9 ANEMIA, UNSPECIFIED: ICD-10-CM

## 2018-11-20 PROCEDURE — 90686 IIV4 VACC NO PRSV 0.5 ML IM: CPT

## 2018-11-20 PROCEDURE — G0008: CPT

## 2018-11-20 PROCEDURE — 99214 OFFICE O/P EST MOD 30 MIN: CPT | Mod: 25

## 2018-11-20 PROCEDURE — 36415 COLL VENOUS BLD VENIPUNCTURE: CPT

## 2018-11-20 NOTE — HISTORY OF PRESENT ILLNESS
[FreeTextEntry1] : Follow-up [de-identified] : Patient comes for follow-up. Last seen in office in 6/18.\par \par She is feeling well today. She needs repeat complement (C5-C9) levels. Was hospitalized in 5/18 for Neisseria Meningitidis bacteremia, completed Ceftriaxone at home. Her complement levels were normal in hospital but ID wanted repeat levels 3 months later. Patient is late on getting labs rechecked as her mother has been hospitalized for 3 months now in the [place] with bacteremia but is improving.

## 2018-11-20 NOTE — REVIEW OF SYSTEMS
[Fever] : no fever [Chills] : no chills [Fatigue] : no fatigue [Earache] : no earache [Chest Pain] : no chest pain [Lower Ext Edema] : no lower extremity edema [Shortness Of Breath] : no shortness of breath [Cough] : no cough [Abdominal Pain] : no abdominal pain [Nausea] : no nausea [Diarrhea] : diarrhea [Dysuria] : no dysuria [Frequency] : no frequency [Joint Pain] : no joint pain [Muscle Pain] : no muscle pain [Back Pain] : no back pain [Itching] : no itching [Skin Rash] : no skin rash [de-identified] : rash resolved

## 2018-11-20 NOTE — ASSESSMENT
[FreeTextEntry1] : 1. Hospitalized in 5/18 with Neisseria meningitidis bacteremia. Repeat complement (C5-C9) levels as per ID recommendation. Check CBC and CRP. She is clinically well.\par \par 2. Mild anemia - Hgb 11. Check CBC and iron studies. \par \par 3. Flu shot given. No complications. \par \par RTO 5/19 for annual physical.

## 2018-11-20 NOTE — PHYSICAL EXAM
[No Acute Distress] : no acute distress [Well Nourished] : well nourished [Well Developed] : well developed [Well-Appearing] : well-appearing [PERRL] : pupils equal round and reactive to light [EOMI] : extraocular movements intact [Normal Oropharynx] : the oropharynx was normal [Normal TMs] : both tympanic membranes were normal [Supple] : supple [No Lymphadenopathy] : no lymphadenopathy [No Respiratory Distress] : no respiratory distress  [Clear to Auscultation] : lungs were clear to auscultation bilaterally [Normal Rate] : normal rate  [Regular Rhythm] : with a regular rhythm [Normal S1, S2] : normal S1 and S2 [No Murmur] : no murmur heard [No Edema] : there was no peripheral edema [No Spinal Tenderness] : no spinal tenderness [No Joint Swelling] : no joint swelling [No Rash] : no rash [Normal Gait] : normal gait [Normal Affect] : the affect was normal [Normal Mood] : the mood was normal [Soft] : abdomen soft [Non Tender] : non-tender [Non-distended] : non-distended

## 2018-11-24 ENCOUNTER — TRANSCRIPTION ENCOUNTER (OUTPATIENT)
Age: 33
End: 2018-11-24

## 2018-11-24 LAB
BASOPHILS # BLD AUTO: 0 K/UL
BASOPHILS NFR BLD AUTO: 0 %
C5 SERPL-MCNC: 16.2 MG/DL
C6 SERPL-MCNC: 67 U/ML
C9 SERPL-MCNC: 65 U/ML
CRP SERPL-MCNC: 0.12 MG/DL
EOSINOPHIL # BLD AUTO: 0.11 K/UL
EOSINOPHIL NFR BLD AUTO: 2 %
FERRITIN SERPL-MCNC: 69 NG/ML
HCT VFR BLD CALC: 41.4 %
HGB BLD-MCNC: 13.9 G/DL
IRON SATN MFR SERPL: 24 %
IRON SERPL-MCNC: 85 UG/DL
LYMPHOCYTES # BLD AUTO: 2.31 K/UL
LYMPHOCYTES NFR BLD AUTO: 41 %
MAN DIFF?: NORMAL
MCHC RBC-ENTMCNC: 32.5 PG
MCHC RBC-ENTMCNC: 33.6 GM/DL
MCV RBC AUTO: 96.7 FL
MONOCYTES # BLD AUTO: 0.28 K/UL
MONOCYTES NFR BLD AUTO: 5 %
NEUTROPHILS # BLD AUTO: 2.87 K/UL
NEUTROPHILS NFR BLD AUTO: 51 %
PLATELET # BLD AUTO: 186 K/UL
RBC # BLD: 4.28 M/UL
RBC # FLD: 12.8 %
TIBC SERPL-MCNC: 347 UG/DL
UIBC SERPL-MCNC: 262 UG/DL
WBC # FLD AUTO: 5.63 K/UL

## 2018-11-27 LAB — C7 SERPL-MCNC: NORMAL

## 2018-12-03 ENCOUNTER — TRANSCRIPTION ENCOUNTER (OUTPATIENT)
Age: 33
End: 2018-12-03

## 2018-12-17 ENCOUNTER — APPOINTMENT (OUTPATIENT)
Dept: INTERNAL MEDICINE | Facility: CLINIC | Age: 33
End: 2018-12-17

## 2019-03-05 ENCOUNTER — APPOINTMENT (OUTPATIENT)
Dept: INTERNAL MEDICINE | Facility: CLINIC | Age: 34
End: 2019-03-05
Payer: COMMERCIAL

## 2019-03-05 VITALS
SYSTOLIC BLOOD PRESSURE: 110 MMHG | DIASTOLIC BLOOD PRESSURE: 78 MMHG | TEMPERATURE: 98.5 F | WEIGHT: 163 LBS | HEIGHT: 66 IN | BODY MASS INDEX: 26.2 KG/M2 | OXYGEN SATURATION: 100 % | HEART RATE: 72 BPM

## 2019-03-05 DIAGNOSIS — R31.9 HEMATURIA, UNSPECIFIED: ICD-10-CM

## 2019-03-05 PROCEDURE — 81003 URINALYSIS AUTO W/O SCOPE: CPT | Mod: QW

## 2019-03-05 PROCEDURE — 99213 OFFICE O/P EST LOW 20 MIN: CPT | Mod: 25

## 2019-03-05 RX ORDER — DOXYCYCLINE HYCLATE 100 MG/1
100 TABLET ORAL TWICE DAILY
Qty: 20 | Refills: 0 | Status: DISCONTINUED | COMMUNITY
Start: 2018-05-25 | End: 2019-03-05

## 2019-03-05 RX ORDER — NORETHINDRONE ACETATE AND ETHINYL ESTRADIOL AND FERROUS FUMARATE 1MG-20(21)
1-20 KIT ORAL
Qty: 28 | Refills: 0 | Status: DISCONTINUED | COMMUNITY
Start: 2018-05-21 | End: 2019-03-05

## 2019-03-05 NOTE — HISTORY OF PRESENT ILLNESS
[FreeTextEntry8] : Patient comes for an acute visit. \par \par She is here for evaluation of hematuria.\par \par She had 2 episodes of hematuria, 1 episode last week and also yesterday. She had an IUD placed in 6/18 and has not had her menses in 6 months. She has urinary frequency. No dysuria. No hesitancy. No fever or chills. No flank pain. Hx of UTI in past.

## 2019-03-05 NOTE — REVIEW OF SYSTEMS
[Fever] : no fever [Chills] : no chills [Fatigue] : no fatigue [Earache] : no earache [Chest Pain] : no chest pain [Lower Ext Edema] : no lower extremity edema [Shortness Of Breath] : no shortness of breath [Cough] : no cough [Abdominal Pain] : no abdominal pain [Nausea] : no nausea [Diarrhea] : diarrhea [Dysuria] : no dysuria [Hematuria] : hematuria [Frequency] : frequency [Joint Pain] : no joint pain [Muscle Pain] : no muscle pain [Back Pain] : no back pain [Itching] : no itching [Skin Rash] : no skin rash

## 2019-03-05 NOTE — PHYSICAL EXAM
[No Acute Distress] : no acute distress [Well Nourished] : well nourished [Well Developed] : well developed [Well-Appearing] : well-appearing [Supple] : supple [No Lymphadenopathy] : no lymphadenopathy [No Respiratory Distress] : no respiratory distress  [Clear to Auscultation] : lungs were clear to auscultation bilaterally [Normal Rate] : normal rate  [Regular Rhythm] : with a regular rhythm [Normal S1, S2] : normal S1 and S2 [No Murmur] : no murmur heard [No Edema] : there was no peripheral edema [Soft] : abdomen soft [Non-distended] : non-distended [No CVA Tenderness] : no CVA  tenderness [No Spinal Tenderness] : no spinal tenderness [Normal Gait] : normal gait [Normal Affect] : the affect was normal [Normal Mood] : the mood was normal

## 2019-03-07 ENCOUNTER — CLINICAL ADVICE (OUTPATIENT)
Age: 34
End: 2019-03-07

## 2019-03-07 ENCOUNTER — TRANSCRIPTION ENCOUNTER (OUTPATIENT)
Age: 34
End: 2019-03-07

## 2019-03-07 LAB — BACTERIA UR CULT: ABNORMAL

## 2019-03-07 RX ORDER — CIPROFLOXACIN HYDROCHLORIDE 500 MG/1
500 TABLET, FILM COATED ORAL TWICE DAILY
Qty: 10 | Refills: 0 | Status: DISCONTINUED | COMMUNITY
Start: 2019-03-05 | End: 2019-03-07

## 2019-03-08 ENCOUNTER — TRANSCRIPTION ENCOUNTER (OUTPATIENT)
Age: 34
End: 2019-03-08

## 2019-06-06 PROBLEM — A39.9 INFECTION DUE TO NEISSERIA MENINGITIDIS: Status: RESOLVED | Noted: 2018-06-05 | Resolved: 2019-06-06

## 2019-06-06 NOTE — PAST MEDICAL HISTORY
[Menstruating] : menstruating [Definite ___ (Date)] : the last menstrual period was [unfilled] [Menarche Age ____] : age at menarche was [unfilled] [Normal Amount/Duration] : it was of a normal amount and duration [Regular Cycle Intervals] : have been regular [Total Preg ___] : G[unfilled]

## 2019-06-07 ENCOUNTER — APPOINTMENT (OUTPATIENT)
Dept: INTERNAL MEDICINE | Facility: CLINIC | Age: 34
End: 2019-06-07
Payer: COMMERCIAL

## 2019-06-07 VITALS
SYSTOLIC BLOOD PRESSURE: 100 MMHG | TEMPERATURE: 97.7 F | WEIGHT: 167 LBS | DIASTOLIC BLOOD PRESSURE: 80 MMHG | HEART RATE: 70 BPM | HEIGHT: 66 IN | OXYGEN SATURATION: 100 % | BODY MASS INDEX: 26.84 KG/M2

## 2019-06-07 DIAGNOSIS — A39.9 MENINGOCOCCAL INFECTION, UNSPECIFIED: ICD-10-CM

## 2019-06-07 DIAGNOSIS — Z86.39 PERSONAL HISTORY OF OTHER ENDOCRINE, NUTRITIONAL AND METABOLIC DISEASE: ICD-10-CM

## 2019-06-07 DIAGNOSIS — Z86.59 PERSONAL HISTORY OF OTHER MENTAL AND BEHAVIORAL DISORDERS: ICD-10-CM

## 2019-06-07 DIAGNOSIS — R91.1 SOLITARY PULMONARY NODULE: ICD-10-CM

## 2019-06-07 DIAGNOSIS — A09 INFECTIOUS GASTROENTERITIS AND COLITIS, UNSPECIFIED: ICD-10-CM

## 2019-06-07 PROCEDURE — G0444 DEPRESSION SCREEN ANNUAL: CPT

## 2019-06-07 PROCEDURE — 36415 COLL VENOUS BLD VENIPUNCTURE: CPT

## 2019-06-07 PROCEDURE — 99395 PREV VISIT EST AGE 18-39: CPT | Mod: 25

## 2019-06-07 RX ORDER — FLUCONAZOLE 150 MG/1
150 TABLET ORAL
Qty: 1 | Refills: 1 | Status: DISCONTINUED | COMMUNITY
Start: 2019-03-05 | End: 2019-06-07

## 2019-06-07 NOTE — PHYSICAL EXAM
[No Acute Distress] : no acute distress [Well Nourished] : well nourished [Well Developed] : well developed [Well-Appearing] : well-appearing [Normal Sclera/Conjunctiva] : normal sclera/conjunctiva [PERRL] : pupils equal round and reactive to light [EOMI] : extraocular movements intact [Normal Outer Ear/Nose] : the outer ears and nose were normal in appearance [Normal Oropharynx] : the oropharynx was normal [Supple] : supple [Normal TMs] : both tympanic membranes were normal [Thyroid Normal, No Nodules] : the thyroid was normal and there were no nodules present [No Lymphadenopathy] : no lymphadenopathy [No Respiratory Distress] : no respiratory distress  [Clear to Auscultation] : lungs were clear to auscultation bilaterally [Normal Rate] : normal rate  [No Accessory Muscle Use] : no accessory muscle use [Regular Rhythm] : with a regular rhythm [Normal S1, S2] : normal S1 and S2 [No Murmur] : no murmur heard [No Varicosities] : no varicosities [Pedal Pulses Present] : the pedal pulses are present [Soft] : abdomen soft [No Edema] : there was no peripheral edema [Non Tender] : non-tender [Non-distended] : non-distended [No Masses] : no abdominal mass palpated [Normal Bowel Sounds] : normal bowel sounds [No HSM] : no HSM [Normal Posterior Cervical Nodes] : no posterior cervical lymphadenopathy [Normal Anterior Cervical Nodes] : no anterior cervical lymphadenopathy [No CVA Tenderness] : no CVA  tenderness [No Spinal Tenderness] : no spinal tenderness [No Joint Swelling] : no joint swelling [Grossly Normal Strength/Tone] : grossly normal strength/tone [Normal Gait] : normal gait [No Focal Deficits] : no focal deficits [Coordination Grossly Intact] : coordination grossly intact [Deep Tendon Reflexes (DTR)] : deep tendon reflexes were 2+ and symmetric [Normal Affect] : the affect was normal [Normal Mood] : the mood was normal [Normal Insight/Judgement] : insight and judgment were intact [Scoliosis] : no scoliosis [de-identified] : defer to GYN [de-identified] : defer to GYN [de-identified] : scattered circular macules along UE, legs, and back

## 2019-06-07 NOTE — HEALTH RISK ASSESSMENT
[Excellent] : ~his/her~  mood as  excellent [No falls in past year] : Patient reported no falls in the past year [0] : 2) Feeling down, depressed, or hopeless: Not at all (0) [Patient reported PAP Smear was normal] : Patient reported PAP Smear was normal [Change in mental status noted] : Change in mental status noted [With Significant Other] : lives with significant other [Employed] : employed [Student] : student [Graduate School] : graduate school [# Of Children ___] : has [unfilled] children [] :  [Sexually Active] : sexually active [Smoke Detector] : smoke detector [Carbon Monoxide Detector] : carbon monoxide detector [Sunscreen] : uses sunscreen [Seat Belt] :  uses seat belt [] : No [High Risk Behavior] : no high risk behavior [Reports changes in hearing] : Reports no changes in hearing [Reports changes in vision] : Reports no changes in vision [Reports changes in dental health] : Reports no changes in dental health [PapSmearDate] : 08/18 [PapSmearComments] : GYN - Dr. Gus Browning

## 2019-06-07 NOTE — ASSESSMENT
[FreeTextEntry1] : Patient comes for annual exam.\par \par 1. Check routine labs as below. \par \par 2. Diet, exercise, and weight maintenance discussed, gained a few pounds. Exercises often with cardio and swimming. She watches the diet well. \par \par 3. Discussed immunizations. DTaP around 2015. Influenza vaccine in Fall.\par \par 4. Annual GYN follow-up and PAP smear. She has an IUD and does not wish to have children.\par \par 5. Mammogram screening at age 38. Colonoscopy screening at age 48.\par \par 6. Repeat CT chest to monitor right lung nodule from 6/18. \par \par RTO in 1 yr for annual physical or PRN for acute care.

## 2019-06-07 NOTE — HISTORY OF PRESENT ILLNESS
[de-identified] : Patient comes for an annual exam.\par \par She feels well, has no complaints.\par \par She gained a few pounds. She watches her diet well and exercises often 6x a week with cardio and swimming. \par \par She leaving early July for a 8 week vacation to [place] with her . She would like Rx for Z-pack.

## 2019-06-07 NOTE — COUNSELING
[Weight management counseling provided] : Weight management [Healthy eating counseling provided] : healthy eating [Good understanding] : Patient has a good understanding of lifestyle changes and the steps needed to achieve self management goals [Activity counseling provided] : activity [Target Wt Loss Goal ___] : Target weight loss goal [unfilled] lbs [Low Fat Diet] : Low fat diet [Decrease Portions] : Decrease food portions [Keep Food Diary] : Keep food diary [___ min/wk activity recommended] : [unfilled] min/wk activity recommended [Walking] : Walking [ - Annual Depression Screening] : Annual Depression Screening

## 2019-06-07 NOTE — REVIEW OF SYSTEMS
[Fatigue] : no fatigue [Recent Change In Weight] : ~T recent weight change [Negative] : Integumentary [FreeTextEntry2] : 4 lb gain

## 2019-06-08 ENCOUNTER — TRANSCRIPTION ENCOUNTER (OUTPATIENT)
Age: 34
End: 2019-06-08

## 2019-06-08 LAB
ALBUMIN SERPL ELPH-MCNC: 4.9 G/DL
ALP BLD-CCNC: 77 U/L
ALT SERPL-CCNC: 12 U/L
ANION GAP SERPL CALC-SCNC: 14 MMOL/L
APPEARANCE: CLEAR
AST SERPL-CCNC: 24 U/L
BACTERIA: NEGATIVE
BASOPHILS # BLD AUTO: 0.05 K/UL
BASOPHILS NFR BLD AUTO: 0.9 %
BILIRUB SERPL-MCNC: 0.5 MG/DL
BILIRUBIN URINE: NEGATIVE
BLOOD URINE: NEGATIVE
BUN SERPL-MCNC: 15 MG/DL
CALCIUM SERPL-MCNC: 9.8 MG/DL
CHLORIDE SERPL-SCNC: 101 MMOL/L
CHOLEST SERPL-MCNC: 251 MG/DL
CHOLEST/HDLC SERPL: 3.5 RATIO
CO2 SERPL-SCNC: 24 MMOL/L
COLOR: YELLOW
CREAT SERPL-MCNC: 0.73 MG/DL
EOSINOPHIL # BLD AUTO: 0.04 K/UL
EOSINOPHIL NFR BLD AUTO: 0.7 %
ESTIMATED AVERAGE GLUCOSE: 100 MG/DL
GLUCOSE QUALITATIVE U: NEGATIVE
GLUCOSE SERPL-MCNC: 97 MG/DL
HBA1C MFR BLD HPLC: 5.1 %
HCT VFR BLD CALC: 43.5 %
HDLC SERPL-MCNC: 72 MG/DL
HGB BLD-MCNC: 14 G/DL
HYALINE CASTS: 0 /LPF
IMM GRANULOCYTES NFR BLD AUTO: 0.2 %
KETONES URINE: NEGATIVE
LDLC SERPL CALC-MCNC: 167 MG/DL
LEUKOCYTE ESTERASE URINE: NEGATIVE
LYMPHOCYTES # BLD AUTO: 2.22 K/UL
LYMPHOCYTES NFR BLD AUTO: 41.6 %
MAN DIFF?: NORMAL
MCHC RBC-ENTMCNC: 31.8 PG
MCHC RBC-ENTMCNC: 32.2 GM/DL
MCV RBC AUTO: 98.9 FL
MICROSCOPIC-UA: NORMAL
MONOCYTES # BLD AUTO: 0.44 K/UL
MONOCYTES NFR BLD AUTO: 8.2 %
NEUTROPHILS # BLD AUTO: 2.58 K/UL
NEUTROPHILS NFR BLD AUTO: 48.4 %
NITRITE URINE: NEGATIVE
PH URINE: 7
PLATELET # BLD AUTO: 241 K/UL
POTASSIUM SERPL-SCNC: 4.3 MMOL/L
PROT SERPL-MCNC: 7.6 G/DL
PROTEIN URINE: ABNORMAL
RBC # BLD: 4.4 M/UL
RBC # FLD: 11.7 %
RED BLOOD CELLS URINE: 3 /HPF
SODIUM SERPL-SCNC: 139 MMOL/L
SPECIFIC GRAVITY URINE: 1.03
SQUAMOUS EPITHELIAL CELLS: 5 /HPF
T4 FREE SERPL-MCNC: 1 NG/DL
TRIGL SERPL-MCNC: 58 MG/DL
TSH SERPL-ACNC: 3.68 UIU/ML
URINE COMMENTS: NORMAL
UROBILINOGEN URINE: NORMAL
WBC # FLD AUTO: 5.34 K/UL
WHITE BLOOD CELLS URINE: 1 /HPF

## 2019-06-13 ENCOUNTER — APPOINTMENT (OUTPATIENT)
Dept: CT IMAGING | Facility: CLINIC | Age: 34
End: 2019-06-13
Payer: COMMERCIAL

## 2019-06-13 ENCOUNTER — OUTPATIENT (OUTPATIENT)
Dept: OUTPATIENT SERVICES | Facility: HOSPITAL | Age: 34
LOS: 1 days | End: 2019-06-13
Payer: COMMERCIAL

## 2019-06-13 DIAGNOSIS — R91.1 SOLITARY PULMONARY NODULE: ICD-10-CM

## 2019-06-13 PROCEDURE — 71250 CT THORAX DX C-: CPT

## 2019-06-13 PROCEDURE — 71250 CT THORAX DX C-: CPT | Mod: 26

## 2019-06-17 ENCOUNTER — TRANSCRIPTION ENCOUNTER (OUTPATIENT)
Age: 34
End: 2019-06-17

## 2020-01-02 ENCOUNTER — APPOINTMENT (OUTPATIENT)
Dept: INTERNAL MEDICINE | Facility: CLINIC | Age: 35
End: 2020-01-02
Payer: COMMERCIAL

## 2020-01-02 ENCOUNTER — LABORATORY RESULT (OUTPATIENT)
Age: 35
End: 2020-01-02

## 2020-01-02 VITALS
DIASTOLIC BLOOD PRESSURE: 74 MMHG | HEIGHT: 66 IN | WEIGHT: 169 LBS | TEMPERATURE: 98.8 F | BODY MASS INDEX: 27.16 KG/M2 | HEART RATE: 98 BPM | OXYGEN SATURATION: 100 % | SYSTOLIC BLOOD PRESSURE: 98 MMHG

## 2020-01-02 DIAGNOSIS — N39.0 URINARY TRACT INFECTION, SITE NOT SPECIFIED: ICD-10-CM

## 2020-01-02 LAB
BILIRUB UR QL STRIP: NORMAL
GLUCOSE UR-MCNC: NORMAL
HCG UR QL: 0.2 EU/DL
HCG UR QL: NEGATIVE
HGB UR QL STRIP.AUTO: NORMAL
KETONES UR-MCNC: NORMAL
LEUKOCYTE ESTERASE UR QL STRIP: NORMAL
NITRITE UR QL STRIP: NORMAL
PH UR STRIP: 7
PROT UR STRIP-MCNC: NORMAL
QUALITY CONTROL: YES
SP GR UR STRIP: 1.01

## 2020-01-02 PROCEDURE — 81003 URINALYSIS AUTO W/O SCOPE: CPT | Mod: QW

## 2020-01-02 PROCEDURE — 99214 OFFICE O/P EST MOD 30 MIN: CPT | Mod: 25

## 2020-01-02 PROCEDURE — 81025 URINE PREGNANCY TEST: CPT

## 2020-01-02 NOTE — HISTORY OF PRESENT ILLNESS
[FreeTextEntry8] : MELO LADD is a 33 yo woman with urinary frequency, low abdominal pressure, burning on urination, rbcs in the urine since yesterday.  Denies fever, back pain.  Feels otherwise well\par \par Has had a small cyst left buttock cheek for years.  Advised surgery eval\par \par Ex smoker

## 2020-01-02 NOTE — PHYSICAL EXAM
[No Acute Distress] : no acute distress [Well Nourished] : well nourished [Normal Sclera/Conjunctiva] : normal sclera/conjunctiva [Well Developed] : well developed [Well-Appearing] : well-appearing [Normal Outer Ear/Nose] : the outer ears and nose were normal in appearance [No JVD] : no jugular venous distention [No Respiratory Distress] : no respiratory distress  [No Accessory Muscle Use] : no accessory muscle use [Clear to Auscultation] : lungs were clear to auscultation bilaterally [Normal Rate] : normal rate  [Regular Rhythm] : with a regular rhythm [Normal S1, S2] : normal S1 and S2 [Soft] : abdomen soft [No CVA Tenderness] : no CVA  tenderness [Normal Gait] : normal gait [Alert and Oriented x3] : oriented to person, place, and time

## 2020-01-02 NOTE — REVIEW OF SYSTEMS
[Vision Problems] : no vision problems [Fever] : no fever [Nasal Discharge] : no nasal discharge [Chest Pain] : no chest pain [Abdominal Pain] : abdominal pain [Hematuria] : hematuria [Dysuria] : dysuria [Frequency] : frequency

## 2020-01-03 LAB
APPEARANCE: CLEAR
BILIRUBIN URINE: NEGATIVE
BLOOD URINE: NORMAL
COLOR: NORMAL
GLUCOSE QUALITATIVE U: NEGATIVE
KETONES URINE: NEGATIVE
LEUKOCYTE ESTERASE URINE: ABNORMAL
NITRITE URINE: NEGATIVE
PH URINE: 7
PROTEIN URINE: NEGATIVE
SPECIFIC GRAVITY URINE: 1.01
UROBILINOGEN URINE: NORMAL

## 2020-01-07 LAB — BACTERIA UR CULT: NORMAL

## 2020-02-02 PROBLEM — R22.2 NODULE OF BUTTOCK: Status: ACTIVE | Noted: 2020-02-02

## 2020-02-03 ENCOUNTER — APPOINTMENT (OUTPATIENT)
Dept: SURGICAL ONCOLOGY | Facility: CLINIC | Age: 35
End: 2020-02-03
Payer: COMMERCIAL

## 2020-02-03 VITALS
OXYGEN SATURATION: 98 % | SYSTOLIC BLOOD PRESSURE: 115 MMHG | DIASTOLIC BLOOD PRESSURE: 76 MMHG | BODY MASS INDEX: 26.52 KG/M2 | WEIGHT: 165 LBS | HEART RATE: 74 BPM | HEIGHT: 66 IN | RESPIRATION RATE: 12 BRPM

## 2020-02-03 DIAGNOSIS — R22.2 LOCALIZED SWELLING, MASS AND LUMP, TRUNK: ICD-10-CM

## 2020-02-03 PROCEDURE — 99243 OFF/OP CNSLTJ NEW/EST LOW 30: CPT

## 2020-02-11 ENCOUNTER — FORM ENCOUNTER (OUTPATIENT)
Age: 35
End: 2020-02-11

## 2020-02-12 ENCOUNTER — OUTPATIENT (OUTPATIENT)
Dept: OUTPATIENT SERVICES | Facility: HOSPITAL | Age: 35
LOS: 1 days | End: 2020-02-12
Payer: COMMERCIAL

## 2020-02-12 ENCOUNTER — APPOINTMENT (OUTPATIENT)
Dept: ULTRASOUND IMAGING | Facility: IMAGING CENTER | Age: 35
End: 2020-02-12
Payer: COMMERCIAL

## 2020-02-12 DIAGNOSIS — R22.2 LOCALIZED SWELLING, MASS AND LUMP, TRUNK: ICD-10-CM

## 2020-02-12 PROCEDURE — 76882 US LMTD JT/FCL EVL NVASC XTR: CPT | Mod: 26,LT

## 2020-02-12 PROCEDURE — 76882 US LMTD JT/FCL EVL NVASC XTR: CPT

## 2020-02-17 ENCOUNTER — TRANSCRIPTION ENCOUNTER (OUTPATIENT)
Age: 35
End: 2020-02-17

## 2020-03-03 ENCOUNTER — FORM ENCOUNTER (OUTPATIENT)
Age: 35
End: 2020-03-03

## 2020-03-04 ENCOUNTER — APPOINTMENT (OUTPATIENT)
Dept: ULTRASOUND IMAGING | Facility: IMAGING CENTER | Age: 35
End: 2020-03-04
Payer: COMMERCIAL

## 2020-03-04 ENCOUNTER — RESULT REVIEW (OUTPATIENT)
Age: 35
End: 2020-03-04

## 2020-03-04 ENCOUNTER — OUTPATIENT (OUTPATIENT)
Dept: OUTPATIENT SERVICES | Facility: HOSPITAL | Age: 35
LOS: 1 days | End: 2020-03-04
Payer: COMMERCIAL

## 2020-03-04 DIAGNOSIS — R92.2 INCONCLUSIVE MAMMOGRAM: ICD-10-CM

## 2020-03-04 PROCEDURE — 88173 CYTOPATH EVAL FNA REPORT: CPT

## 2020-03-04 PROCEDURE — 88172 CYTP DX EVAL FNA 1ST EA SITE: CPT

## 2020-03-04 PROCEDURE — 76942 ECHO GUIDE FOR BIOPSY: CPT | Mod: 26

## 2020-03-04 PROCEDURE — 76942 ECHO GUIDE FOR BIOPSY: CPT

## 2020-03-04 PROCEDURE — 20206 BIOPSY MUSCLE PERQ NEEDLE: CPT

## 2020-03-04 PROCEDURE — 88305 TISSUE EXAM BY PATHOLOGIST: CPT

## 2020-03-04 PROCEDURE — 88173 CYTOPATH EVAL FNA REPORT: CPT | Mod: 26

## 2020-03-04 PROCEDURE — 88305 TISSUE EXAM BY PATHOLOGIST: CPT | Mod: 26

## 2020-03-05 LAB — NON-GYNECOLOGICAL CYTOLOGY STUDY: SIGNIFICANT CHANGE UP

## 2020-07-30 NOTE — PHYSICAL EXAM
[Normal] : supple, no neck mass and thyroid not enlarged [Normal Neck Lymph Nodes] : normal neck lymph nodes  [Normal Supraclavicular Lymph Nodes] : normal supraclavicular lymph nodes [Normal Groin Lymph Nodes] : normal groin lymph nodes [Normal Axillary Lymph Nodes] : normal axillary lymph nodes [Normal] : full range of motion and no deformities appreciated [de-identified] : Below

## 2020-07-30 NOTE — ASSESSMENT
[FreeTextEntry1] : The etiology of the palpable nodule in the upper inner part of the left but it is not apparent on physical examination.\par It does not have any worrisome features, I'm arranging for a target ultrasound for further evaluation.\par \par Even if the ultrasound is unremarkable, she would like the area excised since it is uncomfortable for her, not unreasonable.\par \par Reviewed in detail, all questions answered.\par \par Note dictated\par \par \par 02-13-20.\par I called her but had to leave a voicemail.\par Yesterday she had a sonogram of the palpable nodule of the upper and a part of the left buttock.\par On ultrasound there is a 1.2 x 1.1 x 1.2 cm hypoechoic nodule, smoothly marginated, amenable to needle biopsy.\par Tissue sampling is an option\par \par \par 02-18-20.\par We spoke.\par Reviewed the indications and technique for needle biopsy, she agrees.\par She is away on vacation until next week.\par Prescriptions submitted today, for Lily to call her next week.\par \par \par 03-04-20.\par Left upper buttock core needle biopsy: Benign pathology.\par Acellular material, histiocytes, and multi-nucleated giant cells, and a background of fibrovascular and adipose tissue suggestive of a benign cystic lesion.\par We'll re-image at a 6 month interval, prescription for September 2020 sonogram entered 04/28/20.\par \par \par 07-30-20.\par Yesterday, when contacted by Lily, to schedule a followup left breast ultrasound, patient indicated that she does not want to schedule any more imaging.\par I called her but had to leave a voicemail.\par

## 2020-07-30 NOTE — REASON FOR VISIT
[Initial Consultation] : an initial consultation for [Other: _____] : [unfilled] [FreeTextEntry2] : Left buttock cyst

## 2020-07-30 NOTE — HISTORY OF PRESENT ILLNESS
[de-identified] : 34 through lady referred by her internist Dr. Radha CAMPOS with a cyst in her LEFT BUTTOCK CHEEK.\par This was identified at an acute visit 2020 a UTI.\par Reportedly present for at least five years.\par \par \par She does not think it is growing.\par She is recently started we'll exercises, and the area is uncomfortable when she sits on it.\par \par No other signs or symptoms presently.\par \par No personal history of malignancy.\par \par The only relative with a history of cancers her father with an HPV induced head and neck cancer. He just finished radiation therapy.\par \par \par PMD: Dr. Radha CAMPOS\par Her internist was Dr. 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

## 2020-12-23 PROBLEM — N39.0 ACUTE UTI (URINARY TRACT INFECTION): Status: RESOLVED | Noted: 2020-01-02 | Resolved: 2020-12-23

## 2021-03-25 ENCOUNTER — APPOINTMENT (OUTPATIENT)
Dept: INTERNAL MEDICINE | Facility: CLINIC | Age: 36
End: 2021-03-25
Payer: COMMERCIAL

## 2021-03-25 ENCOUNTER — LABORATORY RESULT (OUTPATIENT)
Age: 36
End: 2021-03-25

## 2021-03-25 VITALS
TEMPERATURE: 97.8 F | WEIGHT: 168 LBS | HEART RATE: 78 BPM | OXYGEN SATURATION: 99 % | DIASTOLIC BLOOD PRESSURE: 70 MMHG | SYSTOLIC BLOOD PRESSURE: 105 MMHG | BODY MASS INDEX: 27 KG/M2 | HEIGHT: 66 IN

## 2021-03-25 PROCEDURE — 99072 ADDL SUPL MATRL&STAF TM PHE: CPT

## 2021-03-25 PROCEDURE — 36415 COLL VENOUS BLD VENIPUNCTURE: CPT

## 2021-03-25 PROCEDURE — 99395 PREV VISIT EST AGE 18-39: CPT | Mod: 25

## 2021-03-25 PROCEDURE — G0444 DEPRESSION SCREEN ANNUAL: CPT | Mod: NC,59

## 2021-03-25 RX ORDER — AZITHROMYCIN 250 MG/1
250 TABLET, FILM COATED ORAL
Qty: 1 | Refills: 0 | Status: DISCONTINUED | COMMUNITY
Start: 2019-06-07 | End: 2021-03-25

## 2021-03-25 RX ORDER — NITROFURANTOIN (MONOHYDRATE/MACROCRYSTALS) 25; 75 MG/1; MG/1
100 CAPSULE ORAL TWICE DAILY
Qty: 10 | Refills: 0 | Status: DISCONTINUED | COMMUNITY
Start: 2020-01-02 | End: 2021-03-25

## 2021-03-25 RX ORDER — FLUCONAZOLE 150 MG/1
150 TABLET ORAL
Qty: 1 | Refills: 0 | Status: DISCONTINUED | COMMUNITY
Start: 2020-01-02 | End: 2021-03-25

## 2021-03-25 RX ORDER — PHENAZOPYRIDINE HYDROCHLORIDE 100 MG/1
100 TABLET ORAL 3 TIMES DAILY
Qty: 12 | Refills: 0 | Status: DISCONTINUED | COMMUNITY
Start: 2020-01-02 | End: 2021-03-25

## 2021-03-25 NOTE — HISTORY OF PRESENT ILLNESS
[FreeTextEntry1] : Annual Physical [de-identified] : MELO LADD is a 35 yo woman here for a physical. She has been well overall.  Recovered well from Covid 19 in 3/2020.  Had Moderna Covid 19 vaccines.\par \par Gyn utd recently.\par \par The patient is single with no kids.  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  She would have no difficulty walking 4 to 6 blocks or 2 flights of stairs. Stopped smoking 10 years ago.

## 2021-03-25 NOTE — HEALTH RISK ASSESSMENT
[Good] : ~his/her~  mood as  good [] : No [Yes] : Yes [Monthly or less (1 pt)] : Monthly or less (1 point) [No] : In the past 12 months have you used drugs other than those required for medical reasons? No [No falls in past year] : Patient reported no falls in the past year [de-identified] : active [de-identified] : regular [None] : None [With Family] : lives with family [Employed] : employed [Feels Safe at Home] : Feels safe at home [Fully functional (bathing, dressing, toileting, transferring, walking, feeding)] : Fully functional (bathing, dressing, toileting, transferring, walking, feeding) [Fully functional (using the telephone, shopping, preparing meals, housekeeping, doing laundry, using] : Fully functional and needs no help or supervision to perform IADLs (using the telephone, shopping, preparing meals, housekeeping, doing laundry, using transportation, managing medications and managing finances) [Reports changes in hearing] : Reports no changes in hearing [Reports changes in vision] : Reports no changes in vision [Reports changes in dental health] : Reports no changes in dental health [Smoke Detector] : smoke detector [Carbon Monoxide Detector] : carbon monoxide detector [Seat Belt] :  uses seat belt

## 2021-03-29 LAB
25(OH)D3 SERPL-MCNC: 34.7 NG/ML
ALBUMIN SERPL ELPH-MCNC: 4.8 G/DL
ALP BLD-CCNC: 57 U/L
ALT SERPL-CCNC: 11 U/L
ANION GAP SERPL CALC-SCNC: 12 MMOL/L
APPEARANCE: CLEAR
AST SERPL-CCNC: 18 U/L
BASOPHILS # BLD AUTO: 0.04 K/UL
BASOPHILS NFR BLD AUTO: 0.6 %
BILIRUB SERPL-MCNC: 0.3 MG/DL
BILIRUBIN URINE: NEGATIVE
BLOOD URINE: NEGATIVE
BUN SERPL-MCNC: 11 MG/DL
C TRACH RRNA SPEC QL NAA+PROBE: NOT DETECTED
CALCIUM SERPL-MCNC: 9.5 MG/DL
CHLORIDE SERPL-SCNC: 100 MMOL/L
CHOLEST SERPL-MCNC: 250 MG/DL
CO2 SERPL-SCNC: 24 MMOL/L
COLOR: NORMAL
CREAT SERPL-MCNC: 0.69 MG/DL
EOSINOPHIL # BLD AUTO: 0.04 K/UL
EOSINOPHIL NFR BLD AUTO: 0.6 %
ESTIMATED AVERAGE GLUCOSE: 100 MG/DL
GLUCOSE QUALITATIVE U: NEGATIVE
GLUCOSE SERPL-MCNC: 81 MG/DL
HBA1C MFR BLD HPLC: 5.1 %
HBV SURFACE AB SER QL: REACTIVE
HBV SURFACE AG SER QL: NONREACTIVE
HCT VFR BLD CALC: 39.5 %
HCV AB SER QL: NONREACTIVE
HCV S/CO RATIO: 0.13 S/CO
HDLC SERPL-MCNC: 70 MG/DL
HGB BLD-MCNC: 12.5 G/DL
HIV1+2 AB SPEC QL IA.RAPID: NONREACTIVE
IMM GRANULOCYTES NFR BLD AUTO: 0.2 %
KETONES URINE: NEGATIVE
LDLC SERPL CALC-MCNC: 163 MG/DL
LEUKOCYTE ESTERASE URINE: ABNORMAL
LYMPHOCYTES # BLD AUTO: 2.67 K/UL
LYMPHOCYTES NFR BLD AUTO: 42.6 %
MAN DIFF?: NORMAL
MCHC RBC-ENTMCNC: 31.6 GM/DL
MCHC RBC-ENTMCNC: 31.9 PG
MCV RBC AUTO: 100.8 FL
MONOCYTES # BLD AUTO: 0.39 K/UL
MONOCYTES NFR BLD AUTO: 6.2 %
N GONORRHOEA RRNA SPEC QL NAA+PROBE: NOT DETECTED
NEUTROPHILS # BLD AUTO: 3.12 K/UL
NEUTROPHILS NFR BLD AUTO: 49.8 %
NITRITE URINE: NEGATIVE
NONHDLC SERPL-MCNC: 180 MG/DL
PH URINE: 6
PLATELET # BLD AUTO: 263 K/UL
POTASSIUM SERPL-SCNC: 3.8 MMOL/L
PROT SERPL-MCNC: 7.4 G/DL
PROTEIN URINE: NEGATIVE
RBC # BLD: 3.92 M/UL
RBC # FLD: 13.1 %
SODIUM SERPL-SCNC: 137 MMOL/L
SOURCE AMPLIFICATION: NORMAL
SPECIFIC GRAVITY URINE: 1.01
T PALLIDUM AB SER QL IA: NEGATIVE
T4 FREE SERPL-MCNC: 1.1 NG/DL
TRIGL SERPL-MCNC: 85 MG/DL
TSH SERPL-ACNC: 2.33 UIU/ML
UROBILINOGEN URINE: NORMAL
VIT B12 SERPL-MCNC: 1173 PG/ML
WBC # FLD AUTO: 6.27 K/UL

## 2022-02-17 ENCOUNTER — TRANSCRIPTION ENCOUNTER (OUTPATIENT)
Age: 37
End: 2022-02-17

## 2022-03-23 ENCOUNTER — APPOINTMENT (OUTPATIENT)
Dept: ORTHOPEDIC SURGERY | Facility: CLINIC | Age: 37
End: 2022-03-23

## 2022-04-06 ENCOUNTER — APPOINTMENT (OUTPATIENT)
Dept: INTERNAL MEDICINE | Facility: CLINIC | Age: 37
End: 2022-04-06
Payer: COMMERCIAL

## 2022-04-06 VITALS
TEMPERATURE: 97.34 F | HEIGHT: 66.5 IN | WEIGHT: 163 LBS | SYSTOLIC BLOOD PRESSURE: 104 MMHG | HEART RATE: 75 BPM | DIASTOLIC BLOOD PRESSURE: 72 MMHG | BODY MASS INDEX: 25.89 KG/M2 | OXYGEN SATURATION: 99 %

## 2022-04-06 PROCEDURE — 36415 COLL VENOUS BLD VENIPUNCTURE: CPT

## 2022-04-06 PROCEDURE — G0444 DEPRESSION SCREEN ANNUAL: CPT | Mod: NC,59

## 2022-04-06 PROCEDURE — 99395 PREV VISIT EST AGE 18-39: CPT | Mod: 25

## 2022-04-06 NOTE — HISTORY OF PRESENT ILLNESS
[FreeTextEntry1] : Annual Physical [de-identified] : MELO LADD is a 36 yo woman here for a physical. She has been well overall.    Had Moderna Covid 19 vaccines and booster.\par \par Gyn utd 6/21 due 6/22. Has mirena IUD..\par \par The patient is single with no kids.  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  She would have no difficulty walking 4 to 6 blocks or 2 flights of stairs. Stopped smoking 10 years ago.

## 2022-04-06 NOTE — ASSESSMENT
[FreeTextEntry1] : HCM\par Labs drawn in office today\par Gyn utd, dye 6/22\par Tdap utd\par f/u prn or 1 year

## 2022-04-06 NOTE — HEALTH RISK ASSESSMENT
[Good] : ~his/her~  mood as  good [Yes] : Yes [Monthly or less (1 pt)] : Monthly or less (1 point) [No] : In the past 12 months have you used drugs other than those required for medical reasons? No [No falls in past year] : Patient reported no falls in the past year [de-identified] : active [de-identified] : regular [None] : None [With Family] : lives with family [Employed] : employed [Feels Safe at Home] : Feels safe at home [Fully functional (bathing, dressing, toileting, transferring, walking, feeding)] : Fully functional (bathing, dressing, toileting, transferring, walking, feeding) [Fully functional (using the telephone, shopping, preparing meals, housekeeping, doing laundry, using] : Fully functional and needs no help or supervision to perform IADLs (using the telephone, shopping, preparing meals, housekeeping, doing laundry, using transportation, managing medications and managing finances) [Reports changes in hearing] : Reports no changes in hearing [Reports changes in vision] : Reports no changes in vision [Reports changes in dental health] : Reports no changes in dental health [Smoke Detector] : smoke detector [Carbon Monoxide Detector] : carbon monoxide detector [Seat Belt] :  uses seat belt

## 2022-04-07 LAB
25(OH)D3 SERPL-MCNC: 25.2 NG/ML
ALBUMIN SERPL ELPH-MCNC: 4.6 G/DL
ALP BLD-CCNC: 55 U/L
ALT SERPL-CCNC: 12 U/L
ANION GAP SERPL CALC-SCNC: 13 MMOL/L
APPEARANCE: CLEAR
AST SERPL-CCNC: 19 U/L
BASOPHILS # BLD AUTO: 0.04 K/UL
BASOPHILS NFR BLD AUTO: 0.6 %
BILIRUB SERPL-MCNC: 0.4 MG/DL
BILIRUBIN URINE: NEGATIVE
BLOOD URINE: NEGATIVE
BUN SERPL-MCNC: 14 MG/DL
CALCIUM SERPL-MCNC: 9.4 MG/DL
CHLORIDE SERPL-SCNC: 103 MMOL/L
CHOLEST SERPL-MCNC: 227 MG/DL
CO2 SERPL-SCNC: 22 MMOL/L
COLOR: COLORLESS
CREAT SERPL-MCNC: 0.7 MG/DL
EGFR: 113 ML/MIN/1.73M2
EOSINOPHIL # BLD AUTO: 0.03 K/UL
EOSINOPHIL NFR BLD AUTO: 0.5 %
ESTIMATED AVERAGE GLUCOSE: 108 MG/DL
GLUCOSE QUALITATIVE U: NEGATIVE
GLUCOSE SERPL-MCNC: 83 MG/DL
HBA1C MFR BLD HPLC: 5.4 %
HCT VFR BLD CALC: 36 %
HDLC SERPL-MCNC: 72 MG/DL
HGB BLD-MCNC: 11.1 G/DL
IMM GRANULOCYTES NFR BLD AUTO: 0.3 %
KETONES URINE: NEGATIVE
LDLC SERPL CALC-MCNC: 140 MG/DL
LEUKOCYTE ESTERASE URINE: NEGATIVE
LYMPHOCYTES # BLD AUTO: 2.42 K/UL
LYMPHOCYTES NFR BLD AUTO: 36.4 %
MAN DIFF?: NORMAL
MCHC RBC-ENTMCNC: 29.3 PG
MCHC RBC-ENTMCNC: 30.8 GM/DL
MCV RBC AUTO: 95 FL
MONOCYTES # BLD AUTO: 0.46 K/UL
MONOCYTES NFR BLD AUTO: 6.9 %
NEUTROPHILS # BLD AUTO: 3.68 K/UL
NEUTROPHILS NFR BLD AUTO: 55.3 %
NITRITE URINE: NEGATIVE
NONHDLC SERPL-MCNC: 154 MG/DL
PH URINE: 5.5
PLATELET # BLD AUTO: 280 K/UL
POTASSIUM SERPL-SCNC: 3.9 MMOL/L
PROT SERPL-MCNC: 7.2 G/DL
PROTEIN URINE: NEGATIVE
RBC # BLD: 3.79 M/UL
RBC # FLD: 13.3 %
SODIUM SERPL-SCNC: 138 MMOL/L
SPECIFIC GRAVITY URINE: 1.01
T4 FREE SERPL-MCNC: 1.3 NG/DL
TRIGL SERPL-MCNC: 71 MG/DL
TSH SERPL-ACNC: 1.24 UIU/ML
UROBILINOGEN URINE: NORMAL
VIT B12 SERPL-MCNC: 662 PG/ML
WBC # FLD AUTO: 6.65 K/UL

## 2022-04-26 ENCOUNTER — TRANSCRIPTION ENCOUNTER (OUTPATIENT)
Age: 37
End: 2022-04-26

## 2022-04-29 ENCOUNTER — NON-APPOINTMENT (OUTPATIENT)
Age: 37
End: 2022-04-29

## 2022-04-29 ENCOUNTER — TRANSCRIPTION ENCOUNTER (OUTPATIENT)
Age: 37
End: 2022-04-29

## 2022-06-22 ENCOUNTER — TRANSCRIPTION ENCOUNTER (OUTPATIENT)
Age: 37
End: 2022-06-22

## 2023-01-06 ENCOUNTER — TRANSCRIPTION ENCOUNTER (OUTPATIENT)
Age: 38
End: 2023-01-06

## 2023-01-30 ENCOUNTER — APPOINTMENT (OUTPATIENT)
Dept: ALLERGY | Facility: CLINIC | Age: 38
End: 2023-01-30
Payer: COMMERCIAL

## 2023-01-30 ENCOUNTER — NON-APPOINTMENT (OUTPATIENT)
Age: 38
End: 2023-01-30

## 2023-01-30 VITALS
OXYGEN SATURATION: 99 % | DIASTOLIC BLOOD PRESSURE: 72 MMHG | HEART RATE: 65 BPM | WEIGHT: 165 LBS | HEIGHT: 66 IN | BODY MASS INDEX: 26.52 KG/M2 | TEMPERATURE: 208.58 F | SYSTOLIC BLOOD PRESSURE: 106 MMHG

## 2023-01-30 PROCEDURE — 99204 OFFICE O/P NEW MOD 45 MIN: CPT

## 2023-01-30 RX ORDER — NIRMATRELVIR AND RITONAVIR 300-100 MG
20 X 150 MG & KIT ORAL
Qty: 30 | Refills: 0 | Status: DISCONTINUED | COMMUNITY
Start: 2023-01-04

## 2023-01-30 NOTE — ASSESSMENT
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

## 2023-01-30 NOTE — SOCIAL HISTORY
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

## 2023-01-30 NOTE — PHYSICAL EXAM
[Alert] : alert [Well Nourished] : well nourished [Healthy Appearance] : healthy appearance [No Acute Distress] : no acute distress [Well Developed] : well developed [Normal Voice/Communication] : normal voice communication [No Neck Mass] : no neck mass was observed [No LAD] : no lymphadenopathy [Normal Rate and Effort] : normal respiratory rhythm and effort [No Crackles] : no crackles [No Retractions] : no retractions [Bilateral Audible Breath Sounds] : bilateral audible breath sounds [Wheezing] : no wheezing was heard [Normal Rate] : heart rate was normal  [Normal S1, S2] : normal S1 and S2 [No murmur] : no murmur [Regular Rhythm] : with a regular rhythm [Soft] : abdomen soft [Not Distended] : not distended [No HSM] : no hepato-splenomegaly [Normal Cervical Lymph Nodes] : cervical [Normal Mood] : mood was normal [Normal Affect] : affect was normal [Judgment and Insight Age Appropriate] : judgement and insight is age appropriate [Alert, Awake, Oriented as Age-Appropriate] : alert, awake, oriented as age appropriate

## 2023-01-30 NOTE — HISTORY OF PRESENT ILLNESS
[de-identified] : She has had COVID 3/20, 12/21, 4/22 and 1/23 - she was treated with Paxlovid for the most recent infection.   Patient has received 2 booster vaccinations for COVID.   She has the flu shot in September of 2022 - she had flu like symptoms - 105 fever - body aches and chills.    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   Patient had positive blood cultures and she was called by PCP and admitted to the hospital.   Complement C5-9 were obtained and were normal. \par \par Sister - lichen sclerosis

## 2023-01-31 ENCOUNTER — TRANSCRIPTION ENCOUNTER (OUTPATIENT)
Age: 38
End: 2023-01-31

## 2023-01-31 LAB
ALBUMIN MFR SERPL ELPH: 60.6 %
ALBUMIN SERPL-MCNC: 4.4 G/DL
ALBUMIN/GLOB SERPL: 1.6 RATIO
ALPHA1 GLOB MFR SERPL ELPH: 3.7 %
ALPHA1 GLOB SERPL ELPH-MCNC: 0.3 G/DL
ALPHA2 GLOB MFR SERPL ELPH: 9.4 %
ALPHA2 GLOB SERPL ELPH-MCNC: 0.7 G/DL
B-GLOBULIN MFR SERPL ELPH: 11.5 %
B-GLOBULIN SERPL ELPH-MCNC: 0.8 G/DL
CD16+CD56+ CELLS # BLD: 74 CELLS/UL
CD16+CD56+ CELLS NFR BLD: 4 %
CD19 CELLS NFR BLD: 246 CELLS/UL
CD3 CELLS # BLD: 1470 CELLS/UL
CD3 CELLS NFR BLD: 80 %
CD3+CD4+ CELLS # BLD: 1062 CELLS/UL
CD3+CD4+ CELLS NFR BLD: 59 %
CD3+CD4+ CELLS/CD3+CD8+ CLL SPEC: 2.96 RATIO
CD3+CD8+ CELLS # SPEC: 359 CELLS/UL
CD3+CD8+ CELLS NFR BLD: 20 %
CELLS.CD3-CD19+/CELLS IN BLOOD: 13 %
CH50 SERPL-MCNC: 68 U/ML
DEPRECATED KAPPA LC FREE/LAMBDA SER: 1.77 RATIO
GAMMA GLOB FLD ELPH-MCNC: 1.1 G/DL
GAMMA GLOB MFR SERPL ELPH: 14.8 %
HIV1+2 AB SPEC QL IA.RAPID: NONREACTIVE
IGA SER QL IEP: 266 MG/DL
IGG SER QL IEP: 1104 MG/DL
IGM SER QL IEP: 101 MG/DL
INTERPRETATION SERPL IEP-IMP: NORMAL
KAPPA LC CSF-MCNC: 1.04 MG/DL
KAPPA LC SERPL-MCNC: 1.84 MG/DL
PROT SERPL-MCNC: 7.2 G/DL
PROT SERPL-MCNC: 7.2 G/DL

## 2023-02-02 LAB
IGG SUBSET TOTAL IGG: 1150 MG/DL
IGG1 SER-MCNC: 771 MG/DL
IGG2 SER-MCNC: 132 MG/DL
IGG3 SER-MCNC: 28 MG/DL
IGG4 SER-MCNC: 1 MG/DL

## 2023-02-03 LAB
C DIPHTHERIAE AB SER QL: 0.33 IU/ML
C TETANI IGG SER-ACNC: 3.99 IU/ML

## 2023-02-08 LAB
C5 SERPL-MCNC: 13.3 MG/DL
C6 SERPL-MCNC: 69 U/ML
C7 SERPL-MCNC: 54 U/ML
C8 SERPL-MCNC: 66 U/ML
C9 SERPL-MCNC: 65 U/ML
COMPLEMENT, ALTERNATE PATHWAY (AH50): 34
DEPRECATED S PNEUM 1 IGG SER-MCNC: 17.3 MCG/ML
DEPRECATED S PNEUM12 AB SER-ACNC: 0.9 MCG/ML
DEPRECATED S PNEUM14 AB SER-ACNC: 2.5 MCG/ML
DEPRECATED S PNEUM17 IGG SER IA-MCNC: 12.9 MCG/ML
DEPRECATED S PNEUM18 IGG SER IA-MCNC: <0.4 MCG/ML
DEPRECATED S PNEUM19 IGG SER-MCNC: 10.9 MCG/ML
DEPRECATED S PNEUM19 IGG SER-MCNC: 21.6 MCG/ML
DEPRECATED S PNEUM2 IGG SER-MCNC: <0.4 MCG/ML
DEPRECATED S PNEUM20 IGG SER-MCNC: 3.3 MCG/ML
DEPRECATED S PNEUM22 IGG SER-MCNC: 32 MCG/ML
DEPRECATED S PNEUM23 AB SER-ACNC: 30.4 MCG/ML
DEPRECATED S PNEUM3 AB SER-ACNC: 6.6 MCG/ML
DEPRECATED S PNEUM34 IGG SER-MCNC: 15.8 MCG/ML
DEPRECATED S PNEUM4 AB SER-ACNC: 1.3 MCG/ML
DEPRECATED S PNEUM5 IGG SER-MCNC: 4 MCG/ML
DEPRECATED S PNEUM6 IGG SER-MCNC: 13.3 MCG/ML
DEPRECATED S PNEUM7 IGG SER-ACNC: 16.9 MCG/ML
DEPRECATED S PNEUM8 AB SER-ACNC: 9.1 MCG/ML
DEPRECATED S PNEUM9 AB SER-ACNC: NORMAL MCG/ML
DEPRECATED S PNEUM9 IGG SER-MCNC: 3.9 MCG/ML
LPT PW BLD-NRATE: NORMAL
LPT PW BLD-NRATE: NORMAL
STREPTOCOCCUS PNEUMONIAE SEROTYPE 11A: 0.7 MCG/ML
STREPTOCOCCUS PNEUMONIAE SEROTYPE 15B: 1.4 MCG/ML
STREPTOCOCCUS PNEUMONIAE SEROTYPE 33F: 2.1 MCG/ML

## 2023-02-09 LAB — C2 SERPL-MCNC: 2.7 MG/DL

## 2023-02-13 ENCOUNTER — APPOINTMENT (OUTPATIENT)
Dept: ALLERGY | Facility: CLINIC | Age: 38
End: 2023-02-13
Payer: COMMERCIAL

## 2023-02-13 DIAGNOSIS — B99.9 UNSPECIFIED INFECTIOUS DISEASE: ICD-10-CM

## 2023-02-13 PROCEDURE — 99214 OFFICE O/P EST MOD 30 MIN: CPT | Mod: 95

## 2023-02-13 NOTE — HISTORY OF PRESENT ILLNESS
[Home] : at home, [unfilled] , at the time of the visit. 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

## 2023-02-13 NOTE — ASSESSMENT
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  The patient, Ria Montelongo , was located at home, at the time of the visit.   The provider, Mitchell Boxer, M.D., was located at the office, [address] [place] [address], at the time of the visit.\par \par The patient,  Ria Montelongo and physician, Mitchell Boxer, M.D., participated in the telehealth encounter.\par \par Verbal consent obtained by  from patient.\par \par The majority of time (>50%) was spent on counseling and coordination of care with this patient and/or family member.  The diagnosis of recurrent infections. \par

## 2023-02-17 ENCOUNTER — LABORATORY RESULT (OUTPATIENT)
Age: 38
End: 2023-02-17

## 2023-02-21 ENCOUNTER — LABORATORY RESULT (OUTPATIENT)
Age: 38
End: 2023-02-21

## 2023-02-23 LAB
MISCELLANEOUS TEST: NORMAL
PROC NAME: NORMAL

## 2023-02-28 ENCOUNTER — TRANSCRIPTION ENCOUNTER (OUTPATIENT)
Age: 38
End: 2023-02-28

## 2023-03-13 ENCOUNTER — TRANSCRIPTION ENCOUNTER (OUTPATIENT)
Age: 38
End: 2023-03-13

## 2023-03-30 ENCOUNTER — TRANSCRIPTION ENCOUNTER (OUTPATIENT)
Age: 38
End: 2023-03-30

## 2023-03-31 ENCOUNTER — TRANSCRIPTION ENCOUNTER (OUTPATIENT)
Age: 38
End: 2023-03-31

## 2023-04-18 ENCOUNTER — APPOINTMENT (OUTPATIENT)
Dept: INTERNAL MEDICINE | Facility: CLINIC | Age: 38
End: 2023-04-18

## 2023-04-26 ENCOUNTER — APPOINTMENT (OUTPATIENT)
Dept: INTERNAL MEDICINE | Facility: CLINIC | Age: 38
End: 2023-04-26

## 2023-05-09 ENCOUNTER — LABORATORY RESULT (OUTPATIENT)
Age: 38
End: 2023-05-09

## 2023-05-10 LAB
25(OH)D3 SERPL-MCNC: 26.5 NG/ML
ALBUMIN SERPL ELPH-MCNC: 4.2 G/DL
ALP BLD-CCNC: 60 U/L
ALT SERPL-CCNC: 10 U/L
ANION GAP SERPL CALC-SCNC: 11 MMOL/L
APPEARANCE: ABNORMAL
AST SERPL-CCNC: 19 U/L
BASOPHILS # BLD AUTO: 0.05 K/UL
BASOPHILS NFR BLD AUTO: 0.9 %
BILIRUB SERPL-MCNC: 0.2 MG/DL
BILIRUBIN URINE: NEGATIVE
BLOOD URINE: NEGATIVE
BUN SERPL-MCNC: 9 MG/DL
CALCIUM SERPL-MCNC: 9.4 MG/DL
CHLORIDE SERPL-SCNC: 103 MMOL/L
CHOLEST SERPL-MCNC: 239 MG/DL
CO2 SERPL-SCNC: 24 MMOL/L
COLOR: YELLOW
CREAT SERPL-MCNC: 0.84 MG/DL
EGFR: 90 ML/MIN/1.73M2
EOSINOPHIL # BLD AUTO: 0.06 K/UL
EOSINOPHIL NFR BLD AUTO: 1.1 %
ESTIMATED AVERAGE GLUCOSE: 108 MG/DL
GLUCOSE QUALITATIVE U: NEGATIVE MG/DL
GLUCOSE SERPL-MCNC: 94 MG/DL
HBA1C MFR BLD HPLC: 5.4 %
HCT VFR BLD CALC: 37.6 %
HDLC SERPL-MCNC: 63 MG/DL
HGB BLD-MCNC: 11.9 G/DL
IMM GRANULOCYTES NFR BLD AUTO: 0.2 %
KETONES URINE: NEGATIVE MG/DL
LDLC SERPL CALC-MCNC: 155 MG/DL
LEUKOCYTE ESTERASE URINE: NEGATIVE
LYMPHOCYTES # BLD AUTO: 1.74 K/UL
LYMPHOCYTES NFR BLD AUTO: 31.8 %
MAN DIFF?: NORMAL
MCHC RBC-ENTMCNC: 29.3 PG
MCHC RBC-ENTMCNC: 31.6 GM/DL
MCV RBC AUTO: 92.6 FL
MONOCYTES # BLD AUTO: 0.49 K/UL
MONOCYTES NFR BLD AUTO: 8.9 %
NEUTROPHILS # BLD AUTO: 3.13 K/UL
NEUTROPHILS NFR BLD AUTO: 57.1 %
NITRITE URINE: NEGATIVE
NONHDLC SERPL-MCNC: 176 MG/DL
PH URINE: 7
PLATELET # BLD AUTO: 237 K/UL
POTASSIUM SERPL-SCNC: 4.2 MMOL/L
PROT SERPL-MCNC: 7 G/DL
PROTEIN URINE: NEGATIVE MG/DL
RBC # BLD: 4.06 M/UL
RBC # FLD: 13.2 %
SODIUM SERPL-SCNC: 138 MMOL/L
SPECIFIC GRAVITY URINE: 1.02
T4 FREE SERPL-MCNC: 1 NG/DL
TRIGL SERPL-MCNC: 103 MG/DL
TSH SERPL-ACNC: 3.08 UIU/ML
UROBILINOGEN URINE: 1 MG/DL
VIT B12 SERPL-MCNC: 682 PG/ML
WBC # FLD AUTO: 5.48 K/UL

## 2023-05-12 ENCOUNTER — APPOINTMENT (OUTPATIENT)
Dept: INTERNAL MEDICINE | Facility: CLINIC | Age: 38
End: 2023-05-12
Payer: COMMERCIAL

## 2023-05-12 ENCOUNTER — NON-APPOINTMENT (OUTPATIENT)
Age: 38
End: 2023-05-12

## 2023-05-12 VITALS
WEIGHT: 172 LBS | TEMPERATURE: 98.2 F | BODY MASS INDEX: 27.64 KG/M2 | RESPIRATION RATE: 16 BRPM | SYSTOLIC BLOOD PRESSURE: 102 MMHG | HEIGHT: 66 IN | OXYGEN SATURATION: 98 % | DIASTOLIC BLOOD PRESSURE: 70 MMHG | HEART RATE: 82 BPM

## 2023-05-12 PROCEDURE — 99396 PREV VISIT EST AGE 40-64: CPT | Mod: 25

## 2023-05-12 PROCEDURE — 93000 ELECTROCARDIOGRAM COMPLETE: CPT

## 2023-05-12 NOTE — HEALTH RISK ASSESSMENT
[Good] : ~his/her~  mood as  good [Yes] : Yes [Monthly or less (1 pt)] : Monthly or less (1 point) [No] : In the past 12 months have you used drugs other than those required for medical reasons? No [No falls in past year] : Patient reported no falls in the past year [None] : None [With Family] : lives with family [Employed] : employed [Feels Safe at Home] : Feels safe at home [Fully functional (bathing, dressing, toileting, transferring, walking, feeding)] : Fully functional (bathing, dressing, toileting, transferring, walking, feeding) [Fully functional (using the telephone, shopping, preparing meals, housekeeping, doing laundry, using] : Fully functional and needs no help or supervision to perform IADLs (using the telephone, shopping, preparing meals, housekeeping, doing laundry, using transportation, managing medications and managing finances) [Smoke Detector] : smoke detector [Carbon Monoxide Detector] : carbon monoxide detector [Seat Belt] :  uses seat belt [Former] : Former [de-identified] : active [de-identified] : regular [Reports changes in hearing] : Reports no changes in hearing [Reports changes in vision] : Reports no changes in vision [Reports changes in dental health] : Reports no changes in dental health [de-identified] : quit 2012

## 2023-05-12 NOTE — PHYSICAL EXAM
[No Acute Distress] : no acute distress [Well Nourished] : well nourished [Well Developed] : well developed [Well-Appearing] : well-appearing [Normal Sclera/Conjunctiva] : normal sclera/conjunctiva [PERRL] : pupils equal round and reactive to light [EOMI] : extraocular movements intact [Normal Outer Ear/Nose] : the outer ears and nose were normal in appearance [Normal Oropharynx] : the oropharynx was normal [Normal TMs] : both tympanic membranes were normal [Normal Nasal Mucosa] : the nasal mucosa was normal [No Lymphadenopathy] : no lymphadenopathy [Supple] : supple [Thyroid Normal, No Nodules] : the thyroid was normal and there were no nodules present [No Respiratory Distress] : no respiratory distress  [No Accessory Muscle Use] : no accessory muscle use [Clear to Auscultation] : lungs were clear to auscultation bilaterally [Normal Rate] : normal rate  [Regular Rhythm] : with a regular rhythm [Normal S1, S2] : normal S1 and S2 [No Murmur] : no murmur heard [No Edema] : there was no peripheral edema [Soft] : abdomen soft [Non Tender] : non-tender [Non-distended] : non-distended [Normal Bowel Sounds] : normal bowel sounds [No CVA Tenderness] : no CVA  tenderness [Coordination Grossly Intact] : coordination grossly intact [No Focal Deficits] : no focal deficits [Normal Gait] : normal gait [Deep Tendon Reflexes (DTR)] : deep tendon reflexes were 2+ and symmetric [Speech Grossly Normal] : speech grossly normal [Memory Grossly Normal] : memory grossly normal [Normal Affect] : the affect was normal [Alert and Oriented x3] : oriented to person, place, and time [Normal Mood] : the mood was normal [Normal Insight/Judgement] : insight and judgment were intact

## 2023-05-12 NOTE — ASSESSMENT
[FreeTextEntry1] : HCM\par Labs discussed with pt today\par low chol diet\par Gyn utd\par Tdap utd\par f/u prn or 1 year

## 2023-05-12 NOTE — HISTORY OF PRESENT ILLNESS
[FreeTextEntry1] : Annual Physical [de-identified] : MELO LADD is a 37 yo woman here for a physical. She has been well overall.  Doing a triathlon on sunday\par \par Gyn scheduled soon. Derm utd. Tdap 2015 utd\par \par The patient is single with no kids.  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  She would have no difficulty walking 4 to 6 blocks or 2 flights of stairs. Exercises regularly without difficulty.  Stopped smoking 11 years ago.

## 2023-05-23 ENCOUNTER — TRANSCRIPTION ENCOUNTER (OUTPATIENT)
Age: 38
End: 2023-05-23

## 2023-05-30 ENCOUNTER — APPOINTMENT (OUTPATIENT)
Dept: ORTHOPEDIC SURGERY | Facility: CLINIC | Age: 38
End: 2023-05-30
Payer: COMMERCIAL

## 2023-05-30 VITALS
WEIGHT: 172 LBS | BODY MASS INDEX: 27.64 KG/M2 | DIASTOLIC BLOOD PRESSURE: 78 MMHG | SYSTOLIC BLOOD PRESSURE: 112 MMHG | HEIGHT: 66 IN

## 2023-05-30 PROCEDURE — 99204 OFFICE O/P NEW MOD 45 MIN: CPT | Mod: 25

## 2023-05-30 PROCEDURE — 20553 NJX 1/MLT TRIGGER POINTS 3/>: CPT

## 2023-05-30 PROCEDURE — 72040 X-RAY EXAM NECK SPINE 2-3 VW: CPT

## 2023-06-03 NOTE — PROCEDURE
[de-identified] : Injection: Trigger Point Injection.\par Indication: Myofascial trigger point of the bilateral upper trapezius\par \par A discussion was had with the patient regarding this procedure and all questions were answered. All risks, benefits and alternatives were discussed. These included but were not limited to bleeding, infection, allergic reaction, and possibility of pneumothorax.  A timeout was done to ensure correct side and location of identified trigger points and pt agreed to the procedure.   Alcohol was used to clean the skin. Ethyl chloride spray was then used as a topical anesthetic. A 5cc syringe was filled with 4cc of 1% lidocaine without epi and 1cc of 40mg/ml methylprednisolone.  A 25-gauge 1.5" needle was used to inject 1 cc into each trigger point . A sterile bandage was then applied. The patient tolerated the procedure well and there were no complications.\par

## 2023-06-03 NOTE — DISCUSSION/SUMMARY
[de-identified] : Discussed findings of today's exam and possible causes of the patient's pain. Educated the patient on their most probable diagnosis of cervicalgia. Reviewed possible courses of treatment, and we collaboratively decided the best course of treatment at this time will include:\par 1. Obtained XR of c-spine today reviewed in detail\par 2. Discussed the option of trigger point injections of upper traps to relieve symptoms. Patient agreed to the procedure. Bilateral upper trapezius trigger point injections were performed today, patient tolerated the procedure well.\par 3. Referral for physical therapy given \par 4. Prescribed mobic and muscle relaxer today\par 5. Patient will follow up in 2 weeks if symptoms persists or as needed \par \par Prachi Staley MD, EdM\par [facility]\par Department of Orthopedics\par

## 2023-06-03 NOTE — ADDENDUM
[FreeTextEntry1] : I Makenzie Manning, JUDE, assisted in the history and documentation of the patient with Dr Prachi Staley on 05/30/2023

## 2023-06-03 NOTE — HISTORY OF PRESENT ILLNESS
[de-identified] : MELO   is a 38 year old  F who presents with upper traps  pain.  Pain is primarily located at the bilateral upper traps.   It began in February 2023, without injury or trauma.  Patient states that she went hiking in another country, however she does not recall any sudden injury\par Pain is described as achy and constant in nature\par Not improving\par Patient has been treated at a chiropractor for at least 4 weeks with no improvement in symptoms\par Has tried acupuncture with no relief \par She was treated and referred by her pcp\par Reports tightness and difficulty with range of motion of the neck\par No prior images\par Denies prior injury/ injections/surgery\par Denies bowel/bladder changes, fevers, chills, saddle anesthesia.  Denies numbness, tingling, weakness of the [lower/upper] extremities.\par

## 2023-06-03 NOTE — PHYSICAL EXAM
[de-identified] : Cervical spine: neg spurlings, pain range of motion, ttp upper traps\par \par THORACIC SPINE\par Inspection reveals thoracic kyphosis\par Range of motion testing demonstrates pain with extension \par + ttp of thoracic parapsinal R T7\par No TTP of the ribs\par NEURO - Normal bulk and tone \par LE strength 5/5 including hip flexion, knee flexion, knee extension, ankle dorsiflexion, ankle plantarflexion, eversion, and EHL \par Toe-walking and heel-walking intact\par Sensation - intact to light touch in bilateral lower extremities. \par LE Reflexes 2+ patellar and Achilles reflexes bilaterally \par no Clonus\par Coordination was age appropriate and intact in all 4 limbs. \par GAIT - Normal base, normal stride length, non-antalgic\par \par  [de-identified] : Start Imaging: The following radiographs were ordered and read by me during this patient's visit. I reviewed each radiograph in detail with the patient and discussed the findings as highlighted below. \par \par 2 Views of the cervical spine were obtained today that show no fracture, or dislocation. There are no degenerative changes seen. There is no malalignment. No obvious osseous abnormality. Otherwise unremarkable.\par

## 2023-06-13 ENCOUNTER — APPOINTMENT (OUTPATIENT)
Dept: ORTHOPEDIC SURGERY | Facility: CLINIC | Age: 38
End: 2023-06-13
Payer: COMMERCIAL

## 2023-06-13 PROCEDURE — 99214 OFFICE O/P EST MOD 30 MIN: CPT | Mod: 25

## 2023-06-13 PROCEDURE — 20553 NJX 1/MLT TRIGGER POINTS 3/>: CPT

## 2023-06-26 ENCOUNTER — APPOINTMENT (OUTPATIENT)
Dept: MAMMOGRAPHY | Facility: IMAGING CENTER | Age: 38
End: 2023-06-26

## 2023-07-13 ENCOUNTER — APPOINTMENT (OUTPATIENT)
Dept: ORTHOPEDIC SURGERY | Facility: CLINIC | Age: 38
End: 2023-07-13
Payer: COMMERCIAL

## 2023-07-13 VITALS
HEIGHT: 66 IN | DIASTOLIC BLOOD PRESSURE: 80 MMHG | OXYGEN SATURATION: 98 % | SYSTOLIC BLOOD PRESSURE: 110 MMHG | BODY MASS INDEX: 27.32 KG/M2 | HEART RATE: 88 BPM | WEIGHT: 170 LBS

## 2023-07-13 PROCEDURE — 99214 OFFICE O/P EST MOD 30 MIN: CPT | Mod: 25

## 2023-07-13 PROCEDURE — 20553 NJX 1/MLT TRIGGER POINTS 3/>: CPT

## 2023-07-28 ENCOUNTER — APPOINTMENT (OUTPATIENT)
Dept: ORTHOPEDIC SURGERY | Facility: CLINIC | Age: 38
End: 2023-07-28
Payer: COMMERCIAL

## 2023-07-28 VITALS — BODY MASS INDEX: 27.32 KG/M2 | WEIGHT: 170 LBS | HEIGHT: 66 IN

## 2023-07-28 PROCEDURE — 99214 OFFICE O/P EST MOD 30 MIN: CPT | Mod: 25

## 2023-07-28 PROCEDURE — 20553 NJX 1/MLT TRIGGER POINTS 3/>: CPT

## 2023-07-31 ENCOUNTER — TRANSCRIPTION ENCOUNTER (OUTPATIENT)
Age: 38
End: 2023-07-31

## 2023-08-18 ENCOUNTER — APPOINTMENT (OUTPATIENT)
Dept: ORTHOPEDIC SURGERY | Facility: CLINIC | Age: 38
End: 2023-08-18
Payer: COMMERCIAL

## 2023-08-18 PROCEDURE — ZZZZZ: CPT

## 2023-08-23 ENCOUNTER — RESULT REVIEW (OUTPATIENT)
Age: 38
End: 2023-08-23

## 2023-08-23 ENCOUNTER — APPOINTMENT (OUTPATIENT)
Dept: MAMMOGRAPHY | Facility: CLINIC | Age: 38
End: 2023-08-23
Payer: COMMERCIAL

## 2023-08-23 PROCEDURE — 77063 BREAST TOMOSYNTHESIS BI: CPT

## 2023-08-23 PROCEDURE — 77067 SCR MAMMO BI INCL CAD: CPT

## 2023-09-06 ENCOUNTER — APPOINTMENT (OUTPATIENT)
Dept: ORTHOPEDIC SURGERY | Facility: CLINIC | Age: 38
End: 2023-09-06
Payer: COMMERCIAL

## 2023-09-06 VITALS
WEIGHT: 170 LBS | SYSTOLIC BLOOD PRESSURE: 121 MMHG | DIASTOLIC BLOOD PRESSURE: 80 MMHG | OXYGEN SATURATION: 98 % | HEIGHT: 66 IN | HEART RATE: 88 BPM | BODY MASS INDEX: 27.32 KG/M2

## 2023-09-06 PROCEDURE — 99204 OFFICE O/P NEW MOD 45 MIN: CPT

## 2023-09-06 NOTE — REASON FOR VISIT
MD Latonya Kang notified pt does not have IV access at this time. [Initial Visit] : an initial visit for [Neck Pain] : neck pain

## 2023-09-06 NOTE — ADDENDUM
[FreeTextEntry1] : I, Katherine Gerber, acted solely as a scribe for Dr. Jass Ferrell MD on this date 09/06/2023    All medical record entries made by the Scribe were at my, Dr. Jass Ferrell MD., direction and personally dictated by me on 09/06/2023 . I have reviewed the chart and agree that the record accurately reflects my personal performance of the history, physical exam, assessment and plan. I have also personally directed, reviewed, and agreed with the chart.

## 2023-09-06 NOTE — ASSESSMENT
[FreeTextEntry1] : I had a lengthy discussion with the patient in regards to their treatment plan and diagnosis. Their symptoms have persisted despite the conservative management they have attempted thus far.  As a result I would like to proceed with a referral to a pain management specialist to see if they are eligible for an epidural injection and other non-operative treatment options.  In tandem with this they should continue with physical therapy/home therapy program. The patient can take Tylenol/NSAIDs as needed for pain control if medically able to. I will have the patient follow-up in 4 to 6 weeks for repeat clinical evaluation.  I encouraged them to follow-up sooner if their symptoms worsen or change in any way.

## 2023-09-06 NOTE — HISTORY OF PRESENT ILLNESS
[de-identified] : 38 year old female who presents for initial evaluation of her neck pain that radiates into her b/l shoulders for 7 months. Patient reports pain with ROM of the neck and reports hearing cracking noises when she moves her neck. She reports she was f/u by Dr. Staley for sxs who sent her here to have her further evaluated. She reports attending physical therapy with little to no relief. She reports receiving trigger point injections w/ Dr. Staley which provided relief.  Denies any issues with balance, gait, dexterity or .

## 2023-09-06 NOTE — PHYSICAL EXAM
[de-identified] : Cervical Physical Exam   Gait - Normal   Station - Normal   Sagittal balance - Normal   Compensatory mechanism? - None   Horizontal gaze - Maintained   Heel walk - Normal   Toe walk - Normal   Reflexes   Biceps - Normal   Triceps - Normal   Brachioradialis - Normal   Patellar - Normal   Gastroc - Normal   Clonus -No  Hoffmans - None   Shoulder exam- normal   Spurling's - None   Wrist Pulses -2+ radial/ulnar   Foot Pulses -2+ DP/PT   Cervical range of motion - Normal   Sensation   C5-T1 sensation intact to light touch bilaterally   L1-S1 sensation intact to light touch bilaterally   Motor                 Deltoid     Bicep       Triceps    WF        WE           IO               Right       5/5 5/5           5/5           5/5          5/5            5/5            5/5 Left         5/5           5/5           5/5           5/5          5/5            5/5            5/5             IP   Quad   HS    TA   Gastroc   EHL Right 5/5    5/5     5/5    5/5      5/5       5/5 Left   5/5    5/5     5/5     5/5     5/5       5/5 [de-identified] : Cervical MRI reviewed  mild areas of stenosis noted in the sub-axial spine  mild compression of the nerve root at C5-C6

## 2023-09-07 ENCOUNTER — TRANSCRIPTION ENCOUNTER (OUTPATIENT)
Age: 38
End: 2023-09-07

## 2023-09-18 ENCOUNTER — APPOINTMENT (OUTPATIENT)
Dept: ORTHOPEDIC SURGERY | Facility: CLINIC | Age: 38
End: 2023-09-18
Payer: COMMERCIAL

## 2023-09-18 PROCEDURE — 20553 NJX 1/MLT TRIGGER POINTS 3/>: CPT

## 2023-09-18 PROCEDURE — 99214 OFFICE O/P EST MOD 30 MIN: CPT | Mod: 25

## 2023-09-27 ENCOUNTER — APPOINTMENT (OUTPATIENT)
Dept: PHYSICAL MEDICINE AND REHAB | Facility: CLINIC | Age: 38
End: 2023-09-27
Payer: COMMERCIAL

## 2023-09-27 ENCOUNTER — NON-APPOINTMENT (OUTPATIENT)
Age: 38
End: 2023-09-27

## 2023-09-27 PROCEDURE — 99214 OFFICE O/P EST MOD 30 MIN: CPT

## 2023-10-09 ENCOUNTER — APPOINTMENT (OUTPATIENT)
Dept: PHYSICAL MEDICINE AND REHAB | Facility: CLINIC | Age: 38
End: 2023-10-09
Payer: COMMERCIAL

## 2023-10-09 ENCOUNTER — OUTPATIENT (OUTPATIENT)
Dept: OUTPATIENT SERVICES | Facility: HOSPITAL | Age: 38
LOS: 1 days | End: 2023-10-09
Payer: COMMERCIAL

## 2023-10-09 DIAGNOSIS — M54.12 RADICULOPATHY, CERVICAL REGION: ICD-10-CM

## 2023-10-09 PROCEDURE — 62321 NJX INTERLAMINAR CRV/THRC: CPT

## 2023-10-31 ENCOUNTER — APPOINTMENT (OUTPATIENT)
Dept: PHYSICAL MEDICINE AND REHAB | Facility: CLINIC | Age: 38
End: 2023-10-31
Payer: COMMERCIAL

## 2023-10-31 DIAGNOSIS — M54.12 RADICULOPATHY, CERVICAL REGION: ICD-10-CM

## 2023-10-31 DIAGNOSIS — M54.2 CERVICALGIA: ICD-10-CM

## 2023-10-31 DIAGNOSIS — M50.30 OTHER CERVICAL DISC DEGENERATION, UNSPECIFIED CERVICAL REGION: ICD-10-CM

## 2023-10-31 PROCEDURE — 99213 OFFICE O/P EST LOW 20 MIN: CPT

## 2023-11-01 NOTE — PATIENT PROFILE ADULT. - VISION (WITH CORRECTIVE LENSES IF THE PATIENT USUALLY WEARS THEM):
Normal vision: sees adequately in most situations; can see medication labels, newsprint pain, abdominal

## 2023-11-30 NOTE — ED CLERICAL - DIVISION
[facility]...
General Sunscreen Counseling: I recommended a broad spectrum sunscreen with a SPF of 30 or higher.  I explained that SPF 30 sunscreens block approximately 97 percent of the sun's harmful rays.  Sunscreens should be applied at least 15 minutes prior to expected sun exposure and then every 2 hours after that as long as sun exposure continues. If swimming or exercising sunscreen should be reapplied every 45 minutes to an hour after getting wet or sweating.  One ounce, or the equivalent of a shot glass full of sunscreen, is adequate to protect the skin not covered by a bathing suit. I also recommended a lip balm with a sunscreen as well. Sun protective clothing can be used in lieu of sunscreen but must be worn the entire time you are exposed to the sun's rays.
Detail Level: Zone

## 2023-12-12 ENCOUNTER — APPOINTMENT (OUTPATIENT)
Dept: ORTHOPEDIC SURGERY | Facility: CLINIC | Age: 38
End: 2023-12-12

## 2024-04-30 ENCOUNTER — TRANSCRIPTION ENCOUNTER (OUTPATIENT)
Age: 39
End: 2024-04-30

## 2024-05-09 ENCOUNTER — LABORATORY RESULT (OUTPATIENT)
Age: 39
End: 2024-05-09

## 2024-05-10 LAB
25(OH)D3 SERPL-MCNC: 92.1 NG/ML
ALBUMIN SERPL ELPH-MCNC: 4.3 G/DL
ALP BLD-CCNC: 62 U/L
ALT SERPL-CCNC: 14 U/L
ANION GAP SERPL CALC-SCNC: 12 MMOL/L
APPEARANCE: CLEAR
AST SERPL-CCNC: 20 U/L
BACTERIA UR CULT: NORMAL
BASOPHILS # BLD AUTO: 0.06 K/UL
BASOPHILS NFR BLD AUTO: 1.3 %
BILIRUB SERPL-MCNC: 0.3 MG/DL
BILIRUBIN URINE: NEGATIVE
BLOOD URINE: NEGATIVE
BUN SERPL-MCNC: 11 MG/DL
CALCIUM SERPL-MCNC: 9.3 MG/DL
CHLORIDE SERPL-SCNC: 103 MMOL/L
CHOLEST SERPL-MCNC: 229 MG/DL
CO2 SERPL-SCNC: 24 MMOL/L
COLOR: YELLOW
CREAT SERPL-MCNC: 0.74 MG/DL
EGFR: 104 ML/MIN/1.73M2
EOSINOPHIL # BLD AUTO: 0.05 K/UL
EOSINOPHIL NFR BLD AUTO: 1.1 %
ESTIMATED AVERAGE GLUCOSE: 100 MG/DL
GLUCOSE QUALITATIVE U: NEGATIVE MG/DL
GLUCOSE SERPL-MCNC: 89 MG/DL
HBA1C MFR BLD HPLC: 5.1 %
HCT VFR BLD CALC: 38.6 %
HDLC SERPL-MCNC: 63 MG/DL
HGB BLD-MCNC: 12.4 G/DL
IMM GRANULOCYTES NFR BLD AUTO: 0.2 %
KETONES URINE: ABNORMAL MG/DL
LDLC SERPL CALC-MCNC: 155 MG/DL
LEUKOCYTE ESTERASE URINE: ABNORMAL
LYMPHOCYTES # BLD AUTO: 1.74 K/UL
LYMPHOCYTES NFR BLD AUTO: 37.3 %
MAN DIFF?: NORMAL
MCHC RBC-ENTMCNC: 31.4 PG
MCHC RBC-ENTMCNC: 32.1 GM/DL
MCV RBC AUTO: 97.7 FL
MONOCYTES # BLD AUTO: 0.42 K/UL
MONOCYTES NFR BLD AUTO: 9 %
NEUTROPHILS # BLD AUTO: 2.39 K/UL
NEUTROPHILS NFR BLD AUTO: 51.1 %
NITRITE URINE: NEGATIVE
NONHDLC SERPL-MCNC: 166 MG/DL
PH URINE: 5.5
PLATELET # BLD AUTO: 286 K/UL
POTASSIUM SERPL-SCNC: 4.3 MMOL/L
PROT SERPL-MCNC: 6.9 G/DL
PROTEIN URINE: NEGATIVE MG/DL
RBC # BLD: 3.95 M/UL
RBC # FLD: 12.5 %
SODIUM SERPL-SCNC: 138 MMOL/L
SPECIFIC GRAVITY URINE: 1.03
T4 FREE SERPL-MCNC: 1.2 NG/DL
TRIGL SERPL-MCNC: 65 MG/DL
TSH SERPL-ACNC: 1.71 UIU/ML
UROBILINOGEN URINE: 0.2 MG/DL
VIT B12 SERPL-MCNC: 1568 PG/ML
WBC # FLD AUTO: 4.67 K/UL

## 2024-05-13 ENCOUNTER — APPOINTMENT (OUTPATIENT)
Dept: INTERNAL MEDICINE | Facility: CLINIC | Age: 39
End: 2024-05-13
Payer: COMMERCIAL

## 2024-05-13 ENCOUNTER — NON-APPOINTMENT (OUTPATIENT)
Age: 39
End: 2024-05-13

## 2024-05-13 VITALS
BODY MASS INDEX: 26.91 KG/M2 | HEIGHT: 66 IN | OXYGEN SATURATION: 99 % | WEIGHT: 167.44 LBS | SYSTOLIC BLOOD PRESSURE: 110 MMHG | HEART RATE: 60 BPM | TEMPERATURE: 98 F | DIASTOLIC BLOOD PRESSURE: 70 MMHG

## 2024-05-13 DIAGNOSIS — E78.00 PURE HYPERCHOLESTEROLEMIA, UNSPECIFIED: ICD-10-CM

## 2024-05-13 DIAGNOSIS — Z00.00 ENCOUNTER FOR GENERAL ADULT MEDICAL EXAMINATION W/OUT ABNORMAL FINDINGS: ICD-10-CM

## 2024-05-13 PROCEDURE — 93000 ELECTROCARDIOGRAM COMPLETE: CPT

## 2024-05-13 PROCEDURE — 36415 COLL VENOUS BLD VENIPUNCTURE: CPT

## 2024-05-13 PROCEDURE — 99396 PREV VISIT EST AGE 40-64: CPT | Mod: 25

## 2024-05-13 RX ORDER — NITROFURANTOIN (MONOHYDRATE/MACROCRYSTALS) 25; 75 MG/1; MG/1
100 CAPSULE ORAL TWICE DAILY
Qty: 10 | Refills: 0 | Status: DISCONTINUED | COMMUNITY
Start: 2024-03-05 | End: 2024-05-13

## 2024-05-13 RX ORDER — CYCLOBENZAPRINE HYDROCHLORIDE 5 MG/1
5 TABLET, FILM COATED ORAL
Qty: 28 | Refills: 0 | Status: DISCONTINUED | COMMUNITY
Start: 2023-05-30 | End: 2024-05-13

## 2024-05-13 RX ORDER — MELOXICAM 15 MG/1
15 TABLET ORAL
Qty: 14 | Refills: 0 | Status: DISCONTINUED | COMMUNITY
Start: 2023-05-30 | End: 2024-05-13

## 2024-05-13 RX ORDER — DICLOFENAC SODIUM 50 MG/1
50 TABLET, DELAYED RELEASE ORAL
Qty: 28 | Refills: 0 | Status: DISCONTINUED | COMMUNITY
Start: 2023-07-13 | End: 2024-05-13

## 2024-05-13 NOTE — HEALTH RISK ASSESSMENT
[Good] : ~his/her~  mood as  good [Yes] : Yes [Monthly or less (1 pt)] : Monthly or less (1 point) [No] : In the past 12 months have you used drugs other than those required for medical reasons? No [No falls in past year] : Patient reported no falls in the past year [None] : None [With Family] : lives with family [Employed] : employed [Feels Safe at Home] : Feels safe at home [Fully functional (bathing, dressing, toileting, transferring, walking, feeding)] : Fully functional (bathing, dressing, toileting, transferring, walking, feeding) [Fully functional (using the telephone, shopping, preparing meals, housekeeping, doing laundry, using] : Fully functional and needs no help or supervision to perform IADLs (using the telephone, shopping, preparing meals, housekeeping, doing laundry, using transportation, managing medications and managing finances) [Smoke Detector] : smoke detector [Carbon Monoxide Detector] : carbon monoxide detector [Seat Belt] :  uses seat belt [Former] : Former [de-identified] : active [de-identified] : regular [Reports changes in hearing] : Reports no changes in hearing [Reports changes in vision] : Reports no changes in vision [Reports changes in dental health] : Reports no changes in dental health [de-identified] : quit 2012

## 2024-05-13 NOTE — ASSESSMENT
[FreeTextEntry1] : HCM Labs discussed with pt today low chol diet Gyn utd Tdap utd Decrease alcohol and smoking f/u prn or 1 year

## 2024-05-13 NOTE — HISTORY OF PRESENT ILLNESS
[FreeTextEntry1] : Annual Physical [de-identified] : MELO LADD is a 40 yo woman here for a physical. She has been well overall.    Gyn, mammogram utd. Derm due. Tdap 2015 utd.  Running a half marathon soon  The patient is single with no kids.  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  She would have no difficulty walking 4 to 6 blocks or 2 flights of stairs. Exercises regularly without difficulty.  Stopped smoking 12 years ago.

## 2024-05-15 ENCOUNTER — APPOINTMENT (OUTPATIENT)
Dept: ORTHOPEDIC SURGERY | Facility: CLINIC | Age: 39
End: 2024-05-15

## 2024-05-15 VITALS
BODY MASS INDEX: 26.52 KG/M2 | OXYGEN SATURATION: 98 % | WEIGHT: 165 LBS | HEIGHT: 66 IN | HEART RATE: 71 BPM | SYSTOLIC BLOOD PRESSURE: 105 MMHG | DIASTOLIC BLOOD PRESSURE: 73 MMHG

## 2024-05-15 PROCEDURE — 20553 NJX 1/MLT TRIGGER POINTS 3/>: CPT

## 2024-05-16 LAB
APO B SERPL-MCNC: 100 MG/DL
BACTERIA UR CULT: NORMAL
C TRACH RRNA SPEC QL NAA+PROBE: NOT DETECTED
CRP SERPL HS-MCNC: 1.7 MG/L
HBV SURFACE AB SER QL: REACTIVE
HBV SURFACE AG SER QL: NONREACTIVE
HCV AB SER QL: NONREACTIVE
HCV S/CO RATIO: 0.2 S/CO
HIV1+2 AB SPEC QL IA.RAPID: NONREACTIVE
N GONORRHOEA RRNA SPEC QL NAA+PROBE: NOT DETECTED
SOURCE AMPLIFICATION: NORMAL
T PALLIDUM AB SER QL IA: NEGATIVE

## 2024-05-21 ENCOUNTER — APPOINTMENT (OUTPATIENT)
Dept: ORTHOPEDIC SURGERY | Facility: CLINIC | Age: 39
End: 2024-05-21
Payer: COMMERCIAL

## 2024-05-21 VITALS
BODY MASS INDEX: 26.52 KG/M2 | HEART RATE: 59 BPM | WEIGHT: 165 LBS | OXYGEN SATURATION: 98 % | DIASTOLIC BLOOD PRESSURE: 69 MMHG | SYSTOLIC BLOOD PRESSURE: 109 MMHG | HEIGHT: 66 IN

## 2024-05-21 DIAGNOSIS — M79.18 MYALGIA, OTHER SITE: ICD-10-CM

## 2024-05-21 DIAGNOSIS — M54.16 RADICULOPATHY, LUMBAR REGION: ICD-10-CM

## 2024-05-21 PROCEDURE — 72100 X-RAY EXAM L-S SPINE 2/3 VWS: CPT

## 2024-05-21 PROCEDURE — 99214 OFFICE O/P EST MOD 30 MIN: CPT

## 2024-05-21 RX ORDER — DICLOFENAC SODIUM 50 MG/1
50 TABLET, DELAYED RELEASE ORAL
Qty: 28 | Refills: 0 | Status: ACTIVE | COMMUNITY
Start: 2024-05-21 | End: 1900-01-01

## 2024-05-23 NOTE — ADDENDUM
[FreeTextEntry1] :  I, Prachi Staley MD, EdM, personally performed the services described in the documentation, reviewed the documentation recorded by the scribe in my presence and it accurately and completely records my words and actions.

## 2024-05-23 NOTE — PROCEDURE
[de-identified] : Injection: Trigger Point Injection. Indication: Myofascial trigger point of the right gluteal musculature A discussion was had with the patient regarding this procedure and all questions were answered. All risks, benefits and alternatives were discussed. These included but were not limited to bleeding, infection, allergic reaction, and possibility of pneumothorax.  A timeout was done to ensure correct side and location of identified trigger points and pt agreed to the procedure.   Alcohol was used to clean the skin. Ethyl chloride spray was then used as a topical anesthetic. A 5cc syringe was filled with 4cc of 1% lidocaine without epi and 1cc of 40mg/ml methylprednisolone.  A 25-gauge 1.5" needle was used to inject 1 cc into each trigger point . A sterile bandage was then applied. The patient tolerated the procedure well and there were no complications.

## 2024-05-23 NOTE — DISCUSSION/SUMMARY
[de-identified] : Discussed findings of today's exam and possible causes of patient's pain.  Educated patient on their most probable diagnosis of gluteal pain secondary to lumbar radiculopathy vs muscular strain. Reviewed possible courses of treatment, and we collaboratively decided best course of treatment at this time will include referral to physical therapy to improve gluteal strength.  We discussed that to prevent recurrence, the patient will have to learn and maintain a home stretching and exercise routine. Given severity and length of patient's symptoms, I have provided gluteal trigger point cortisone injection to the right gluteal muscles. Informed the patient that the numbing medicine in today's injection will last for about 4-6 hours. The steroid that was injected will start to work in 1 to 2 days, peak at 1-2 weeks, and may last up to 4-6 weeks.  Pain may worsen in over the next 72 hours. Follow up in 6 weeks, if pain does not resolve I recommend further evaluation of the lumbar spine.   I Makenzie Manning ATC, assisted in the history and documentation of the patient with Dr Prachi Staley on 05/16/2024.  Prachi Staley MD, EdM [facility] Department of Orthopedics

## 2024-05-23 NOTE — PHYSICAL EXAM
[de-identified] : Constitutional: Well-nourished, well-developed, No acute distress Respiratory: Good respiratory effort, no SOB Lymphatic: No regional lymphadenopathy, no lymphedema Psychiatric: Pleasant and normal affect, alert and oriented x3 Skin: Clean dry and intact B/L UE/LE Musculoskeletal: normal except where as noted in regional exam  LUMBAR SPINE Inspection reveals no scoliosis Range of motion testing demonstrates pain with flexion, full ROM Facet loading maneuver negative - tenderness to palpation of lumbar paraspinal muscles.  Seated slump test : negative  Straight leg raise : negative   HIPS/PELVIS - Symmetric in standing and lying  Hip maneuvers:  Range of motion full and pain-free passive hip impingement sign negative KAROL negative  Log roll negative Sacroiliac maneuvers: +TTP of  right PSIS  AP glide negative Brady's negative Resisted active straight leg raise negative + TTP of the right buttock, non-ttp greater trochanters and IT band  NEURO - Normal bulk and tone  LE strength 5/5 including hip flexion, knee flexion, knee extension, ankle dorsiflexion, ankle plantarflexion, eversion, and EHL  Toe-walking and heel-walking intact Sensation - intact to light touch in bilateral lower extremities.  LE Reflexes 2+ patellar and Achilles reflexes bilaterally  no Clonus Coordination was age appropriate and intact in all 4 limbs.  GAIT - Normal base, normal stride length, non-antalgic [de-identified] : XR of lumbar spine was ordered today. Patient declined XRs.

## 2024-05-28 ENCOUNTER — TRANSCRIPTION ENCOUNTER (OUTPATIENT)
Age: 39
End: 2024-05-28

## 2024-05-29 ENCOUNTER — APPOINTMENT (OUTPATIENT)
Dept: MRI IMAGING | Facility: CLINIC | Age: 39
End: 2024-05-29
Payer: COMMERCIAL

## 2024-05-29 PROCEDURE — 72148 MRI LUMBAR SPINE W/O DYE: CPT

## 2024-06-05 ENCOUNTER — NON-APPOINTMENT (OUTPATIENT)
Age: 39
End: 2024-06-05

## 2024-08-27 ENCOUNTER — RESULT REVIEW (OUTPATIENT)
Age: 39
End: 2024-08-27

## 2024-08-27 ENCOUNTER — APPOINTMENT (OUTPATIENT)
Dept: ULTRASOUND IMAGING | Facility: CLINIC | Age: 39
End: 2024-08-27
Payer: COMMERCIAL

## 2024-08-27 ENCOUNTER — APPOINTMENT (OUTPATIENT)
Dept: MAMMOGRAPHY | Facility: CLINIC | Age: 39
End: 2024-08-27
Payer: COMMERCIAL

## 2024-08-27 PROCEDURE — 76641 ULTRASOUND BREAST COMPLETE: CPT | Mod: 50

## 2024-08-27 PROCEDURE — 77063 BREAST TOMOSYNTHESIS BI: CPT

## 2024-08-27 PROCEDURE — 77067 SCR MAMMO BI INCL CAD: CPT

## 2024-08-28 ENCOUNTER — TRANSCRIPTION ENCOUNTER (OUTPATIENT)
Age: 39
End: 2024-08-28

## 2024-08-30 ENCOUNTER — TRANSCRIPTION ENCOUNTER (OUTPATIENT)
Age: 39
End: 2024-08-30

## 2024-09-11 ENCOUNTER — APPOINTMENT (OUTPATIENT)
Dept: MRI IMAGING | Facility: CLINIC | Age: 39
End: 2024-09-11
Payer: COMMERCIAL

## 2024-09-11 PROCEDURE — 77049 MRI BREAST C-+ W/CAD BI: CPT

## 2024-09-11 PROCEDURE — A9585: CPT

## 2025-03-20 ENCOUNTER — TRANSCRIPTION ENCOUNTER (OUTPATIENT)
Age: 40
End: 2025-03-20

## 2025-05-01 ENCOUNTER — TRANSCRIPTION ENCOUNTER (OUTPATIENT)
Age: 40
End: 2025-05-01

## 2025-05-08 LAB
25(OH)D3 SERPL-MCNC: 51.9 NG/ML
ALBUMIN SERPL ELPH-MCNC: 4.4 G/DL
ALP BLD-CCNC: 58 U/L
ALT SERPL-CCNC: 13 U/L
ANION GAP SERPL CALC-SCNC: 16 MMOL/L
APPEARANCE: CLEAR
AST SERPL-CCNC: 20 U/L
BASOPHILS # BLD AUTO: 0.06 K/UL
BASOPHILS NFR BLD AUTO: 1.1 %
BILIRUB SERPL-MCNC: 0.5 MG/DL
BILIRUBIN URINE: NEGATIVE
BLOOD URINE: NEGATIVE
BUN SERPL-MCNC: 7 MG/DL
CALCIUM SERPL-MCNC: 9.4 MG/DL
CHLORIDE SERPL-SCNC: 103 MMOL/L
CHOLEST SERPL-MCNC: 259 MG/DL
CO2 SERPL-SCNC: 20 MMOL/L
COLOR: YELLOW
CREAT SERPL-MCNC: 0.7 MG/DL
EGFRCR SERPLBLD CKD-EPI 2021: 111 ML/MIN/1.73M2
EOSINOPHIL # BLD AUTO: 0.06 K/UL
EOSINOPHIL NFR BLD AUTO: 1.1 %
ESTIMATED AVERAGE GLUCOSE: 91 MG/DL
GLUCOSE QUALITATIVE U: NEGATIVE MG/DL
GLUCOSE SERPL-MCNC: 95 MG/DL
HBA1C MFR BLD HPLC: 4.8 %
HCT VFR BLD CALC: 40.7 %
HDLC SERPL-MCNC: 64 MG/DL
HGB BLD-MCNC: 13.2 G/DL
IMM GRANULOCYTES NFR BLD AUTO: 0.4 %
KETONES URINE: NEGATIVE MG/DL
LDLC SERPL-MCNC: 178 MG/DL
LEUKOCYTE ESTERASE URINE: NEGATIVE
LYMPHOCYTES # BLD AUTO: 1.53 K/UL
LYMPHOCYTES NFR BLD AUTO: 29.3 %
MAN DIFF?: NORMAL
MCHC RBC-ENTMCNC: 31.4 PG
MCHC RBC-ENTMCNC: 32.4 G/DL
MCV RBC AUTO: 96.9 FL
MONOCYTES # BLD AUTO: 0.45 K/UL
MONOCYTES NFR BLD AUTO: 8.6 %
NEUTROPHILS # BLD AUTO: 3.11 K/UL
NEUTROPHILS NFR BLD AUTO: 59.5 %
NITRITE URINE: NEGATIVE
NONHDLC SERPL-MCNC: 195 MG/DL
PH URINE: 5.5
PLATELET # BLD AUTO: 309 K/UL
POTASSIUM SERPL-SCNC: 4 MMOL/L
PROT SERPL-MCNC: 7.2 G/DL
PROTEIN URINE: NEGATIVE MG/DL
RBC # BLD: 4.2 M/UL
RBC # FLD: 12.6 %
SODIUM SERPL-SCNC: 139 MMOL/L
SPECIFIC GRAVITY URINE: 1.01
T4 FREE SERPL-MCNC: 1.1 NG/DL
TRIGL SERPL-MCNC: 97 MG/DL
TSH SERPL-ACNC: 2.58 UIU/ML
UROBILINOGEN URINE: 0.2 MG/DL
VIT B12 SERPL-MCNC: 1045 PG/ML
WBC # FLD AUTO: 5.23 K/UL

## 2025-05-14 ENCOUNTER — APPOINTMENT (OUTPATIENT)
Dept: INTERNAL MEDICINE | Facility: CLINIC | Age: 40
End: 2025-05-14
Payer: COMMERCIAL

## 2025-05-14 ENCOUNTER — NON-APPOINTMENT (OUTPATIENT)
Age: 40
End: 2025-05-14

## 2025-05-14 VITALS
HEART RATE: 57 BPM | HEIGHT: 66 IN | DIASTOLIC BLOOD PRESSURE: 70 MMHG | BODY MASS INDEX: 26.36 KG/M2 | OXYGEN SATURATION: 99 % | WEIGHT: 164 LBS | SYSTOLIC BLOOD PRESSURE: 110 MMHG | TEMPERATURE: 98.5 F

## 2025-05-14 DIAGNOSIS — E78.00 PURE HYPERCHOLESTEROLEMIA, UNSPECIFIED: ICD-10-CM

## 2025-05-14 DIAGNOSIS — Z00.00 ENCOUNTER FOR GENERAL ADULT MEDICAL EXAMINATION W/OUT ABNORMAL FINDINGS: ICD-10-CM

## 2025-05-14 PROCEDURE — 90471 IMMUNIZATION ADMIN: CPT

## 2025-05-14 PROCEDURE — 90715 TDAP VACCINE 7 YRS/> IM: CPT

## 2025-05-14 PROCEDURE — 99396 PREV VISIT EST AGE 40-64: CPT | Mod: 25

## 2025-05-14 PROCEDURE — 93000 ELECTROCARDIOGRAM COMPLETE: CPT

## 2025-05-14 RX ORDER — ATOVAQUONE AND PROGUANIL HYDROCHLORIDE 250; 100 MG/1; MG/1
250-100 TABLET, FILM COATED ORAL DAILY
Qty: 30 | Refills: 0 | Status: ACTIVE | COMMUNITY
Start: 2025-05-14 | End: 1900-01-01

## 2025-05-14 RX ORDER — ALPRAZOLAM 0.25 MG/1
0.25 TABLET ORAL
Qty: 5 | Refills: 0 | Status: ACTIVE | COMMUNITY
Start: 2025-05-14 | End: 1900-01-01

## 2025-05-21 ENCOUNTER — APPOINTMENT (OUTPATIENT)
Dept: ORTHOPEDIC SURGERY | Facility: CLINIC | Age: 40
End: 2025-05-21

## 2025-05-21 VITALS
SYSTOLIC BLOOD PRESSURE: 113 MMHG | HEIGHT: 66 IN | HEART RATE: 59 BPM | WEIGHT: 164 LBS | BODY MASS INDEX: 26.36 KG/M2 | DIASTOLIC BLOOD PRESSURE: 73 MMHG | OXYGEN SATURATION: 99 %

## 2025-05-21 DIAGNOSIS — M25.551 PAIN IN RIGHT HIP: ICD-10-CM

## 2025-05-21 PROCEDURE — 99213 OFFICE O/P EST LOW 20 MIN: CPT

## 2025-05-21 PROCEDURE — 73502 X-RAY EXAM HIP UNI 2-3 VIEWS: CPT | Mod: RT

## 2025-06-05 ENCOUNTER — APPOINTMENT (OUTPATIENT)
Dept: MRI IMAGING | Facility: CLINIC | Age: 40
End: 2025-06-05

## 2025-06-10 ENCOUNTER — APPOINTMENT (OUTPATIENT)
Dept: ORTHOPEDIC SURGERY | Facility: CLINIC | Age: 40
End: 2025-06-10

## 2025-09-04 ENCOUNTER — APPOINTMENT (OUTPATIENT)
Dept: ULTRASOUND IMAGING | Facility: CLINIC | Age: 40
End: 2025-09-04
Payer: COMMERCIAL

## 2025-09-04 ENCOUNTER — RESULT REVIEW (OUTPATIENT)
Age: 40
End: 2025-09-04

## 2025-09-04 ENCOUNTER — TRANSCRIPTION ENCOUNTER (OUTPATIENT)
Age: 40
End: 2025-09-04

## 2025-09-04 ENCOUNTER — APPOINTMENT (OUTPATIENT)
Dept: MAMMOGRAPHY | Facility: CLINIC | Age: 40
End: 2025-09-04
Payer: COMMERCIAL

## 2025-09-04 PROCEDURE — 77067 SCR MAMMO BI INCL CAD: CPT

## 2025-09-04 PROCEDURE — 77063 BREAST TOMOSYNTHESIS BI: CPT

## 2025-09-04 PROCEDURE — 76641 ULTRASOUND BREAST COMPLETE: CPT | Mod: 50

## 2025-09-12 ENCOUNTER — NON-APPOINTMENT (OUTPATIENT)
Age: 40
End: 2025-09-12

## 2025-09-16 ENCOUNTER — APPOINTMENT (OUTPATIENT)
Dept: INTERNAL MEDICINE | Facility: CLINIC | Age: 40
End: 2025-09-16
Payer: COMMERCIAL

## 2025-09-16 DIAGNOSIS — E78.00 PURE HYPERCHOLESTEROLEMIA, UNSPECIFIED: ICD-10-CM

## 2025-09-16 DIAGNOSIS — R92.30 DENSE BREASTS, UNSPECIFIED: ICD-10-CM

## 2025-09-16 PROCEDURE — 99212 OFFICE O/P EST SF 10 MIN: CPT | Mod: 95

## 2025-09-16 PROCEDURE — G2211 COMPLEX E/M VISIT ADD ON: CPT | Mod: NC,95
